# Patient Record
Sex: MALE | Race: WHITE | Employment: FULL TIME | URBAN - METROPOLITAN AREA
[De-identification: names, ages, dates, MRNs, and addresses within clinical notes are randomized per-mention and may not be internally consistent; named-entity substitution may affect disease eponyms.]

---

## 2023-01-25 ENCOUNTER — LAB ENCOUNTER (OUTPATIENT)
Dept: LAB | Age: 38
End: 2023-01-25
Attending: FAMILY MEDICINE
Payer: COMMERCIAL

## 2023-01-25 ENCOUNTER — EKG ENCOUNTER (OUTPATIENT)
Dept: LAB | Age: 38
End: 2023-01-25
Attending: FAMILY MEDICINE
Payer: COMMERCIAL

## 2023-01-25 ENCOUNTER — OFFICE VISIT (OUTPATIENT)
Dept: FAMILY MEDICINE CLINIC | Facility: CLINIC | Age: 38
End: 2023-01-25

## 2023-01-25 ENCOUNTER — HOSPITAL ENCOUNTER (OUTPATIENT)
Dept: GENERAL RADIOLOGY | Age: 38
Discharge: HOME | End: 2023-01-25
Attending: FAMILY MEDICINE
Payer: COMMERCIAL

## 2023-01-25 VITALS
WEIGHT: 266.63 LBS | HEART RATE: 114 BPM | HEIGHT: 70 IN | SYSTOLIC BLOOD PRESSURE: 156 MMHG | DIASTOLIC BLOOD PRESSURE: 104 MMHG | BODY MASS INDEX: 38.17 KG/M2

## 2023-01-25 DIAGNOSIS — Z00.00 PHYSICAL EXAM: ICD-10-CM

## 2023-01-25 DIAGNOSIS — G89.29 CHRONIC BILATERAL LOW BACK PAIN WITH LEFT-SIDED SCIATICA: ICD-10-CM

## 2023-01-25 DIAGNOSIS — M54.42 CHRONIC BILATERAL LOW BACK PAIN WITH LEFT-SIDED SCIATICA: ICD-10-CM

## 2023-01-25 DIAGNOSIS — Z00.00 PHYSICAL EXAM: Primary | ICD-10-CM

## 2023-01-25 DIAGNOSIS — E66.09 CLASS 2 OBESITY DUE TO EXCESS CALORIES WITHOUT SERIOUS COMORBIDITY WITH BODY MASS INDEX (BMI) OF 38.0 TO 38.9 IN ADULT: ICD-10-CM

## 2023-01-25 LAB
ALBUMIN SERPL-MCNC: 4.3 G/DL (ref 3.4–5)
ALBUMIN/GLOB SERPL: 1 {RATIO} (ref 1–2)
ALP LIVER SERPL-CCNC: 73 U/L
ALT SERPL-CCNC: 132 U/L
ANION GAP SERPL CALC-SCNC: 14 MMOL/L (ref 0–18)
AST SERPL-CCNC: 50 U/L (ref 15–37)
BASOPHILS # BLD AUTO: 0.05 X10(3) UL (ref 0–0.2)
BASOPHILS NFR BLD AUTO: 0.8 %
BILIRUB SERPL-MCNC: 1.1 MG/DL (ref 0.1–2)
BILIRUB UR QL: NEGATIVE
BUN BLD-MCNC: 13 MG/DL (ref 7–18)
BUN/CREAT SERPL: 13.3 (ref 10–20)
CALCIUM BLD-MCNC: 9.6 MG/DL (ref 8.5–10.1)
CHLORIDE SERPL-SCNC: 101 MMOL/L (ref 98–112)
CHOLEST SERPL-MCNC: 189 MG/DL (ref ?–200)
CLARITY UR: CLEAR
CO2 SERPL-SCNC: 21 MMOL/L (ref 21–32)
COLOR UR: YELLOW
CREAT BLD-MCNC: 0.98 MG/DL
DEPRECATED RDW RBC AUTO: 37.3 FL (ref 35.1–46.3)
EOSINOPHIL # BLD AUTO: 0.07 X10(3) UL (ref 0–0.7)
EOSINOPHIL NFR BLD AUTO: 1.1 %
ERYTHROCYTE [DISTWIDTH] IN BLOOD BY AUTOMATED COUNT: 12.2 % (ref 11–15)
EST. AVERAGE GLUCOSE BLD GHB EST-MCNC: 303 MG/DL (ref 68–126)
FASTING PATIENT LIPID ANSWER: NO
FASTING STATUS PATIENT QL REPORTED: NO
GFR SERPLBLD BASED ON 1.73 SQ M-ARVRAT: 102 ML/MIN/1.73M2 (ref 60–?)
GLOBULIN PLAS-MCNC: 4.1 G/DL (ref 2.8–4.4)
GLUCOSE BLD-MCNC: 274 MG/DL (ref 70–99)
GLUCOSE UR-MCNC: >=1000 MG/DL
HBA1C MFR BLD: 12.2 % (ref ?–5.7)
HCT VFR BLD AUTO: 49.8 %
HDLC SERPL-MCNC: 27 MG/DL (ref 40–59)
HGB BLD-MCNC: 17.2 G/DL
IMM GRANULOCYTES # BLD AUTO: 0.02 X10(3) UL (ref 0–1)
IMM GRANULOCYTES NFR BLD: 0.3 %
KETONES UR-MCNC: 40 MG/DL
LDLC SERPL CALC-MCNC: 81 MG/DL (ref ?–100)
LEUKOCYTE ESTERASE UR QL STRIP.AUTO: NEGATIVE
LYMPHOCYTES # BLD AUTO: 2.8 X10(3) UL (ref 1–4)
LYMPHOCYTES NFR BLD AUTO: 44.7 %
MCH RBC QN AUTO: 29.1 PG (ref 26–34)
MCHC RBC AUTO-ENTMCNC: 34.5 G/DL (ref 31–37)
MCV RBC AUTO: 84.1 FL
MONOCYTES # BLD AUTO: 0.43 X10(3) UL (ref 0.1–1)
MONOCYTES NFR BLD AUTO: 6.9 %
NEUTROPHILS # BLD AUTO: 2.9 X10 (3) UL (ref 1.5–7.7)
NEUTROPHILS # BLD AUTO: 2.9 X10(3) UL (ref 1.5–7.7)
NEUTROPHILS NFR BLD AUTO: 46.2 %
NITRITE UR QL STRIP.AUTO: NEGATIVE
NONHDLC SERPL-MCNC: 162 MG/DL (ref ?–130)
OSMOLALITY SERPL CALC.SUM OF ELEC: 292 MOSM/KG (ref 275–295)
PH UR: 5.5 [PH] (ref 5–8)
PLATELET # BLD AUTO: 217 10(3)UL (ref 150–450)
POTASSIUM SERPL-SCNC: 4.1 MMOL/L (ref 3.5–5.1)
PROT SERPL-MCNC: 8.4 G/DL (ref 6.4–8.2)
PROT UR-MCNC: >=300 MG/DL
PSA SERPL-MCNC: 0.68 NG/ML (ref ?–4)
RBC # BLD AUTO: 5.92 X10(6)UL
SODIUM SERPL-SCNC: 136 MMOL/L (ref 136–145)
SP GR UR STRIP: 1.02 (ref 1–1.03)
TRIGL SERPL-MCNC: 504 MG/DL (ref 30–149)
TSI SER-ACNC: 1.6 MIU/ML (ref 0.36–3.74)
UROBILINOGEN UR STRIP-ACNC: 0.2
VIT D+METAB SERPL-MCNC: 13.8 NG/ML (ref 30–100)
VLDLC SERPL CALC-MCNC: 81 MG/DL (ref 0–30)
WBC # BLD AUTO: 6.3 X10(3) UL (ref 4–11)

## 2023-01-25 PROCEDURE — 3080F DIAST BP >= 90 MM HG: CPT | Performed by: FAMILY MEDICINE

## 2023-01-25 PROCEDURE — 72110 X-RAY EXAM L-2 SPINE 4/>VWS: CPT | Performed by: FAMILY MEDICINE

## 2023-01-25 PROCEDURE — 81001 URINALYSIS AUTO W/SCOPE: CPT

## 2023-01-25 PROCEDURE — 81015 MICROSCOPIC EXAM OF URINE: CPT

## 2023-01-25 PROCEDURE — 36415 COLL VENOUS BLD VENIPUNCTURE: CPT

## 2023-01-25 PROCEDURE — 3008F BODY MASS INDEX DOCD: CPT | Performed by: FAMILY MEDICINE

## 2023-01-25 PROCEDURE — 84443 ASSAY THYROID STIM HORMONE: CPT

## 2023-01-25 PROCEDURE — 99203 OFFICE O/P NEW LOW 30 MIN: CPT | Performed by: FAMILY MEDICINE

## 2023-01-25 PROCEDURE — 83036 HEMOGLOBIN GLYCOSYLATED A1C: CPT

## 2023-01-25 PROCEDURE — 85025 COMPLETE CBC W/AUTO DIFF WBC: CPT

## 2023-01-25 PROCEDURE — 93005 ELECTROCARDIOGRAM TRACING: CPT

## 2023-01-25 PROCEDURE — 80053 COMPREHEN METABOLIC PANEL: CPT

## 2023-01-25 PROCEDURE — 99385 PREV VISIT NEW AGE 18-39: CPT | Performed by: FAMILY MEDICINE

## 2023-01-25 PROCEDURE — 3077F SYST BP >= 140 MM HG: CPT | Performed by: FAMILY MEDICINE

## 2023-01-25 PROCEDURE — 84153 ASSAY OF PSA TOTAL: CPT

## 2023-01-25 PROCEDURE — 80061 LIPID PANEL: CPT

## 2023-01-25 PROCEDURE — 82306 VITAMIN D 25 HYDROXY: CPT

## 2023-01-25 PROCEDURE — 93010 ELECTROCARDIOGRAM REPORT: CPT | Performed by: INTERNAL MEDICINE

## 2023-01-25 PROCEDURE — 3046F HEMOGLOBIN A1C LEVEL >9.0%: CPT | Performed by: FAMILY MEDICINE

## 2023-01-25 RX ORDER — CYCLOBENZAPRINE HCL 5 MG
5 TABLET ORAL 3 TIMES DAILY PRN
Qty: 30 TABLET | Refills: 0 | Status: SHIPPED | OUTPATIENT
Start: 2023-01-25

## 2023-01-26 LAB
ATRIAL RATE: 92 BPM
P AXIS: 48 DEGREES
P-R INTERVAL: 184 MS
Q-T INTERVAL: 370 MS
QRS DURATION: 116 MS
QTC CALCULATION (BEZET): 457 MS
R AXIS: -74 DEGREES
T AXIS: 24 DEGREES
VENTRICULAR RATE: 92 BPM

## 2023-02-02 ENCOUNTER — TELEPHONE (OUTPATIENT)
Dept: FAMILY MEDICINE CLINIC | Facility: CLINIC | Age: 38
End: 2023-02-02

## 2023-02-02 ENCOUNTER — OFFICE VISIT (OUTPATIENT)
Dept: FAMILY MEDICINE CLINIC | Facility: CLINIC | Age: 38
End: 2023-02-02

## 2023-02-02 VITALS
BODY MASS INDEX: 38.34 KG/M2 | SYSTOLIC BLOOD PRESSURE: 122 MMHG | HEIGHT: 70 IN | HEART RATE: 106 BPM | DIASTOLIC BLOOD PRESSURE: 68 MMHG | WEIGHT: 267.81 LBS

## 2023-02-02 DIAGNOSIS — R94.31 ABNORMAL EKG: ICD-10-CM

## 2023-02-02 DIAGNOSIS — E55.9 VITAMIN D DEFICIENCY: ICD-10-CM

## 2023-02-02 DIAGNOSIS — E11.9 TYPE 2 DIABETES MELLITUS WITHOUT COMPLICATION, WITHOUT LONG-TERM CURRENT USE OF INSULIN (HCC): Primary | ICD-10-CM

## 2023-02-02 DIAGNOSIS — E78.1 HYPERTRIGLYCERIDEMIA: ICD-10-CM

## 2023-02-02 PROBLEM — E66.01 MORBID (SEVERE) OBESITY DUE TO EXCESS CALORIES (HCC): Status: ACTIVE | Noted: 2023-02-02

## 2023-02-02 PROCEDURE — 3074F SYST BP LT 130 MM HG: CPT | Performed by: FAMILY MEDICINE

## 2023-02-02 PROCEDURE — 99214 OFFICE O/P EST MOD 30 MIN: CPT | Performed by: FAMILY MEDICINE

## 2023-02-02 PROCEDURE — 3008F BODY MASS INDEX DOCD: CPT | Performed by: FAMILY MEDICINE

## 2023-02-02 PROCEDURE — 3078F DIAST BP <80 MM HG: CPT | Performed by: FAMILY MEDICINE

## 2023-02-02 RX ORDER — FENOFIBRATE 67 MG/1
1 CAPSULE ORAL NIGHTLY
Qty: 90 CAPSULE | Refills: 3 | Status: SHIPPED | OUTPATIENT
Start: 2023-02-02 | End: 2023-03-04

## 2023-02-02 RX ORDER — EMPAGLIFLOZIN 25 MG/1
1 TABLET, FILM COATED ORAL DAILY
Qty: 90 TABLET | Refills: 2 | Status: SHIPPED | OUTPATIENT
Start: 2023-02-02

## 2023-02-02 RX ORDER — ERGOCALCIFEROL 1.25 MG/1
50000 CAPSULE ORAL WEEKLY
Qty: 12 CAPSULE | Refills: 4 | Status: SHIPPED | OUTPATIENT
Start: 2023-02-02 | End: 2023-03-04

## 2023-02-03 ENCOUNTER — TELEPHONE (OUTPATIENT)
Dept: FAMILY MEDICINE CLINIC | Facility: CLINIC | Age: 38
End: 2023-02-03

## 2023-02-03 NOTE — TELEPHONE ENCOUNTER
Patient called office. Patient's date of birth and full name both confirmed. Asking for prescriptions to be [facility] to [facility]. He was there yesterday at 5pm, but they only gave him metformin and fenofibrate. RN advised Vitamind D2 was sent. And advised him to speak with pharmacy staff again. He verbalizes understanding of all information, and agreeable to plan. E-Prescribing Status: Receipt confirmed by pharmacy (2/2/2023 12:01 PM CST)    Per Prior authorization encounter, RN advised Jardiance Prior Authorization was initiated, and await 1-5 days for a decision. He verbalizes understanding of all information, and agreeable to plan.

## 2023-02-07 ENCOUNTER — MED REC SCAN ONLY (OUTPATIENT)
Dept: FAMILY MEDICINE CLINIC | Facility: CLINIC | Age: 38
End: 2023-02-07

## 2023-02-07 NOTE — TELEPHONE ENCOUNTER
Called patient and he states that pharmacy told him that Amisha Feliz is ready for  and cost will be $35.00. Suggested that patient call his insurance to get name of  covered alternative and to call us with that information.

## 2023-02-07 NOTE — TELEPHONE ENCOUNTER
Prior authorization for jardiance has been denied. Please refer to denial letter that was faxed to the office. Denied    PA Case: [id number], Status: Denied. Notification: Completed.  Case ID: [id number]      Payer:  [employer]   Electronic appeal:  Not supported   View History

## 2023-02-10 ENCOUNTER — HOSPITAL ENCOUNTER (OUTPATIENT)
Dept: ENDOCRINOLOGY | Age: 38
Discharge: HOME | End: 2023-02-10
Attending: FAMILY MEDICINE
Payer: COMMERCIAL

## 2023-02-10 DIAGNOSIS — E11.65 TYPE 2 DIABETES MELLITUS WITH HYPERGLYCEMIA, WITHOUT LONG-TERM CURRENT USE OF INSULIN (HCC): Primary | ICD-10-CM

## 2023-02-10 NOTE — PROGRESS NOTES
Grace Marquez  DOB2/28/1985 attended Step 2 1:1 Diabetes Education Class:    Date: 2/10/2023  Start time:1030 End time: 36    Wt: Wt Readings from Last 1 Encounters:  02/02/23 : 267 lb 12.8 oz    Weight change since start of program: lost 4 lb    Diabetes Overview:  Pathophysiology, pre-diabetes, A1C results, treatment options for diabetes self-management, types of diabetes, myths and facts, risk factors, benefits of good blood glucose control and psychosocial aspects reviewed. Patient has demonstrated inconsistent self-monitoring and dietary tracking. Healthy Eating:  Reviewed Balanced Plate Method and discussed examples of Balanced Plate Method meal planning. Reviewed macronutrients (carbohydrate, protein, fat) and their impact on blood glucose levels. Introduced benefits of lower fat food choices. Being Active:  Discussed exercise benefits and precautions including its effect on blood glucose levels. Monitoring:  Benefits and options for glucose monitoring, target BG goals, HgbA1C values. Emphasized importance and benefit of dietary tracking. Problem Solving: Prevention, detection and treatment of acute complications:  Discussed signs, symptoms and treatment of hypoglycemia and hyperglycemia. Medication:  Instructed on classes of Diabetes medications available and additional cardiovascular and renal benefits. Behavior Change:  Goals set for nutrition and monitoring of blood glucose. Discussed how habits are formed, identifying cues and triggers. Discussed identifying barriers to action. Discussed how dietary tracking benefits weight loss. Reviewed and updated individual goals and action plan set by patient. Recommendations:  Implement healthy eating habits with portion control and following Balanced Plate Method. Monitor blood glucose as directed. Attend remaining sessions. Continue to implement individual goals.     Written materials provided for all areas covered. Patient verbalized understanding and has no further questions at this time.     Sonia Burgess, RN,MSN

## 2023-02-17 ENCOUNTER — MED REC SCAN ONLY (OUTPATIENT)
Dept: FAMILY MEDICINE CLINIC | Facility: CLINIC | Age: 38
End: 2023-02-17

## 2023-02-24 ENCOUNTER — HOSPITAL ENCOUNTER (OUTPATIENT)
Dept: ENDOCRINOLOGY | Age: 38
Discharge: HOME | End: 2023-02-24
Attending: FAMILY MEDICINE
Payer: COMMERCIAL

## 2023-02-24 DIAGNOSIS — E11.65 TYPE 2 DIABETES MELLITUS WITH HYPERGLYCEMIA, WITHOUT LONG-TERM CURRENT USE OF INSULIN (HCC): Primary | ICD-10-CM

## 2023-02-24 NOTE — PROGRESS NOTES
Jaun Vidales  DOB2/28/1985 attended Step 2 & 3 1:1 Diabetes Education Class: per MD order    Date: 2/24/2023  Start time: 1100 am End time: 1200 pm    Wt: Wt Readings from Last 1 Encounters:  02/02/23 : 267 lb 12.8 oz    Weight change since start of program: lost 5 lb    Blood Glucose: Not controlled: Not progessing toward goal     Healthy Eating:  Reviewed Basic Diet Guidelines as foundation of diabetes meal planning. Reinforced the balanced plate method. Taught nutrition basics defining carbohydrate, protein, and fat. Taught label reading, including an option to count carbohydrate grams or servings. Provided patient with goals for carbohydrate grams/choices for meals and snacks. Discussed sugar substitutes, ETOH and effect on blood glucose. Candido's helpful for smart phones, as well as websites for examining carbohydrate levels provided. Reviewed and reinforced macronutrient's, carbohydrate counting and meal planning. Problem Solving:  Reinforced signs, symptoms, and treatment of hypoglycemia using the Rule of 15. Discussed impact of different food items and portion sizes on blood glucose levels. Discussed blood glucose target of 180 mg/dL, if testing 2 hours post meal.    Monitoring:  Reviewed blood glucose records and importance of tracking foods/blood glucose levels. Reinforced the importance of taking medications as prescribed. Behavior Change:  Reviewed and updated individual goals and action plan set by patient. Addressed barriers to tracking foods/blood glucose levels and following guidelines presented/achieving goals, as a group discussion. Recommendations: Follow individual meal plan recommended by Educator (Matthew Mcgill). Continue implementing individual goals. Attend remaining sessions. Begin implementing carbohydrate counting and continue dietary and blood glucose tracking. Patient interested in starting [employer] provided for all areas covered.     Patient verbalized understanding and has no further questions currently     Donnell Bansal, RN,MSN,[facility]

## 2023-03-24 ENCOUNTER — TELEPHONE (OUTPATIENT)
Dept: ENDOCRINOLOGY | Facility: HOSPITAL | Age: 38
End: 2023-03-24

## 2023-03-24 NOTE — TELEPHONE ENCOUNTER
Alvin Muir reached out to patient to re confirm appt time. Pt has expressed interest to start Ozempic . 25 mg weekly.  Order pended for Dr Allyn Dennis approval.

## 2023-03-30 ENCOUNTER — HOSPITAL ENCOUNTER (OUTPATIENT)
Dept: ENDOCRINOLOGY | Age: 38
Discharge: HOME | End: 2023-03-30
Attending: FAMILY MEDICINE
Payer: COMMERCIAL

## 2023-03-30 DIAGNOSIS — E11.65 TYPE 2 DIABETES MELLITUS WITH HYPERGLYCEMIA, WITHOUT LONG-TERM CURRENT USE OF INSULIN (HCC): Primary | ICD-10-CM

## 2023-03-30 NOTE — PROGRESS NOTES
Annel Granado  DOB2/28/1985 attended Step 4 Group Diabetes Education Class:     Date: 3/30/2023  Start time: 0930 End time: 56    Wt: Wt Readings from Last 1 Encounters:  02/02/23 : 267 lb 12.8 oz    Weight change since start of program: lost 12 lb    Blood Glucose: Not controlled: Progressing toward goal    Reviewed information covered in previous classes including benefits of maintaining good blood glucose control and healthy weight in decreasing diabetes complications. Prevention, detection, and treatment of chronic complications  Reviewed how to reduce risks of complications including eye, foot, dental, renal, and cardiovascular. Discussed American Diabetes Association guidelines for testing including eye exam, lipid panels, urine protein tests, dental and foot exams. Encouraged to get annual flu shot. Discussed importance of immunizations, vaccinations, and guidelines for sick day management. Discussed wearing medical ID. Problem Solving  Discussed signs, symptoms, and prevention of DKA and HHNS. Discussed disaster preparedness and Diabetes. Discussed Diabetes medication assistance and supply management. Healthy Eating  Reviewed and reinforced macronutrients, carbohydrate counting, and meal planning. Reviewed meal planning methods. Discussed healthy eating approaches for dining out, holidays and special occasions. Provided tips on how family members can support healthy changes in diet. Behavior Change  Reviewed and updated individual goals and action plan set by patient. Recommendations:  Monitor blood glucose as directed. Follow individual meal plan and continue dietary tracking. Attend remaining sessions. Continue to implement individual goals. Written materials provided for all areas covered. Patient verbalized understanding and has no further questions at this time.      Lara Mcgarry, RN,MSN  Patient will follow up with educator in 1 month for review of [address] [place] [address]

## 2023-05-03 ENCOUNTER — OFFICE VISIT (OUTPATIENT)
Dept: FAMILY MEDICINE CLINIC | Facility: CLINIC | Age: 38
End: 2023-05-03

## 2023-05-03 ENCOUNTER — LAB ENCOUNTER (OUTPATIENT)
Dept: LAB | Age: 38
End: 2023-05-03
Attending: FAMILY MEDICINE
Payer: COMMERCIAL

## 2023-05-03 VITALS
SYSTOLIC BLOOD PRESSURE: 130 MMHG | HEART RATE: 84 BPM | BODY MASS INDEX: 36.7 KG/M2 | WEIGHT: 256.38 LBS | DIASTOLIC BLOOD PRESSURE: 80 MMHG | HEIGHT: 70 IN

## 2023-05-03 DIAGNOSIS — E11.9 TYPE 2 DIABETES MELLITUS WITHOUT COMPLICATION, WITHOUT LONG-TERM CURRENT USE OF INSULIN (HCC): ICD-10-CM

## 2023-05-03 LAB
CARTRIDGE LOT#: ABNORMAL NUMERIC
CREAT UR-SCNC: 102 MG/DL
HEMOGLOBIN A1C: 6.9 % (ref 4.3–5.6)
MICROALBUMIN UR-MCNC: 2.46 MG/DL
MICROALBUMIN/CREAT 24H UR-RTO: 24.1 UG/MG (ref ?–30)

## 2023-05-03 PROCEDURE — 99213 OFFICE O/P EST LOW 20 MIN: CPT | Performed by: FAMILY MEDICINE

## 2023-05-03 PROCEDURE — 3008F BODY MASS INDEX DOCD: CPT | Performed by: FAMILY MEDICINE

## 2023-05-03 PROCEDURE — 3079F DIAST BP 80-89 MM HG: CPT | Performed by: FAMILY MEDICINE

## 2023-05-03 PROCEDURE — 82043 UR ALBUMIN QUANTITATIVE: CPT

## 2023-05-03 PROCEDURE — 3044F HG A1C LEVEL LT 7.0%: CPT | Performed by: FAMILY MEDICINE

## 2023-05-03 PROCEDURE — 3061F NEG MICROALBUMINURIA REV: CPT | Performed by: FAMILY MEDICINE

## 2023-05-03 PROCEDURE — 82570 ASSAY OF URINE CREATININE: CPT

## 2023-05-03 PROCEDURE — 83036 HEMOGLOBIN GLYCOSYLATED A1C: CPT | Performed by: FAMILY MEDICINE

## 2023-05-03 PROCEDURE — 3075F SYST BP GE 130 - 139MM HG: CPT | Performed by: FAMILY MEDICINE

## 2023-05-03 RX ORDER — SEMAGLUTIDE 0.68 MG/ML
0.5 INJECTION, SOLUTION SUBCUTANEOUS WEEKLY
Qty: 6 ML | Refills: 1 | Status: SHIPPED | OUTPATIENT
Start: 2023-05-03 | End: 2023-06-02

## 2023-05-11 ENCOUNTER — HOSPITAL ENCOUNTER (OUTPATIENT)
Dept: ENDOCRINOLOGY | Age: 38
Discharge: HOME | End: 2023-05-11
Attending: FAMILY MEDICINE
Payer: COMMERCIAL

## 2023-05-11 DIAGNOSIS — E11.9 TYPE 2 DIABETES MELLITUS WITHOUT COMPLICATION, WITHOUT LONG-TERM CURRENT USE OF INSULIN (HCC): Primary | ICD-10-CM

## 2023-05-11 RX ORDER — SEMAGLUTIDE 2.68 MG/ML
2 INJECTION, SOLUTION SUBCUTANEOUS WEEKLY
Qty: 1 EACH | Refills: 12 | Status: CANCELLED | OUTPATIENT
Start: 2023-05-11

## 2023-05-11 NOTE — PROGRESS NOTES
Rachel Lancaster  DOB2/28/1985 attended Step 5  Diabetes Education Class:    Date: 5/11/2023  Start time: 1045 End time: 9749     Wt: Wt Readings from Last 1 Encounters:  05/03/23 : 256 lb 6.4 oz    Weight change since start of program: lost 18 lb    POC HbA1c: 6.9% today    Blood Glucose: Controlled: Stable    Healthy Eating  Reviewed and reinforced macronutrients, carbohydrate counting, and meal planning. Reviewed meal planning methods. Taught principles of heart healthy eating (fats, cholesterol, fiber, and sodium intake). Discussed Mediterranean meal planning. Taught label guidelines for choosing fat controlled proteins, snacks and desserts. Discussed how to integrate information for meal planning. Medication   Guidelines for cholesterol medications for persons with Diabetes. Healthy Coping  Instructed on developing and implementing a support plan. Discussed avoiding Diabetes burnout, dealing with stress and stress reduction techniques. Discussed recognizing and dealing with depression and diabetes. Being Active  Reinforced the benefits of exercise and safety precautions. Discussed the effects of blood glucose levels and role in weight loss. Discussed strategies for increasing activity and maintaining regular exercise regimen. Problem Solving  List of community resources provided and discussed. Behavior Change  Reviewed and updated individual goals set by patient. Action plan completed and Diabetes support plan initiated    Patient has identified the following goal(s) and actions related to: Healthy Eating, Being Active, Monitoring Blood Glucose and Taking Medications  Diabetes goal assessment and action plan scanned into Media  Rachel Lancaster has chosen the following ongoing Diabetes Support Plan: Exercise programs    Written materials provided for all areas covered. Patient verbalized understanding and has no further questions at this time.      Stan Vidales, RN,MSN

## 2023-07-26 DIAGNOSIS — E11.9 TYPE 2 DIABETES MELLITUS WITHOUT COMPLICATION, WITHOUT LONG-TERM CURRENT USE OF INSULIN (HCC): ICD-10-CM

## 2023-07-27 RX ORDER — SEMAGLUTIDE 0.68 MG/ML
0.5 INJECTION, SOLUTION SUBCUTANEOUS WEEKLY
Qty: 6 ML | Refills: 1 | Status: SHIPPED | OUTPATIENT
Start: 2023-07-27 | End: 2023-08-26

## 2023-07-27 NOTE — TELEPHONE ENCOUNTER
Refill passed per [employer], [facility] protocol. Requested Prescriptions   Pending Prescriptions Disp Refills    semaglutide (OZEMPIC, 0.25 OR 0.5 MG/DOSE,) 2 MG/3ML Subcutaneous Solution Pen-injector 6 mL 1     Sig: Inject 0.5 mg into the skin once a week.        Diabetes Medication Protocol Passed - 7/26/2023 10:52 AM        Passed - Last A1C < 7.5 and within past 6 months     Lab Results   Component Value Date    A1C 6.9 (A) 05/03/2023             Passed - In person appointment or virtual visit in the past 6 mos or appointment in next 3 mos     Recent Outpatient Visits              2 months ago Type 2 diabetes mellitus without complication, without long-term current use of insulin ([address]    [facility], Rhys Velez MD    Office Visit    5 months ago Type 2 diabetes mellitus without complication, without long-term current use of insulin [facility])    [facility], Oswaldo Arguelles MD    Office Visit    6 months ago Physical exam    [address] [facility], Oswaldo Arguelles MD    Office Visit    2 years ago Acute bilateral low back pain without sciatica    [address] [facility]    Office Visit          Future Appointments         Provider Department Appt Notes    In 3 months Isaura Velez MD [address] [facility], Rhys Montalvo [address] or GFRNAA > 50     GFR Evaluation  EGFRCR: 102 , resulted on 1/25/2023          Passed - GFR in the past 12 months              Recent Outpatient Visits              2 months ago Type 2 diabetes mellitus without complication, without long-term current use of insulin [facility])    [facility], Oswaldo Arguelles MD    Office Visit    5 months ago Type 2 diabetes mellitus without complication, without long-term current use of insulin ([address]    [facility], [place] Warner, Rhys Loredo MD    Office Visit    6 months ago Physical exam    [address], [address], Marisa Gorman MD    Office Visit    2 years ago Acute bilateral low back pain without sciatica    [address] [facility],     Office Visit            Future Appointments         Provider Department Appt Notes    In 3 months MD Rodney Anderson [place] [address] [facility]

## 2023-08-04 RX ORDER — EMPAGLIFLOZIN 25 MG/1
1 TABLET, FILM COATED ORAL DAILY
Qty: 90 TABLET | Refills: 0 | Status: SHIPPED | OUTPATIENT
Start: 2023-08-04

## 2023-08-04 NOTE — TELEPHONE ENCOUNTER
Jardiance 25mg   Take 1 tablet by mouth daily. , Normal, Disp-90 tablet, R-2   Dispense: 90 tablet   Refills: 2 ordered   Pharmacy: [place] [id number] [id number], [phone], [phone] (Ph: [phone])   Order Details  Ordered on: 02/02/23   Authorizing provider: Juvencio John MD     Sending remainder of 2/2/23 prescription to requested pharmacy. Refill passed per [facility] protocol. Requested Prescriptions   Pending Prescriptions Disp Refills    Empagliflozin (JARDIANCE) 25 MG Oral Tab 90 tablet 2     Sig: Take 1 tablet by mouth daily.        Diabetes Medication Protocol Passed - 8/4/2023  4:20 AM        Passed - Last A1C < 7.5 and within past 6 months     Lab Results   Component Value Date    A1C 6.9 (A) 05/03/2023             Passed - In person appointment or virtual visit in the past 6 mos or appointment in next 3 mos     Recent Outpatient Visits              3 months ago Type 2 diabetes mellitus without complication, without long-term current use of insulin [facility])    [address], [place] Warner, Gil Quach MD    Office Visit    6 months ago Type 2 diabetes mellitus without complication, without long-term current use of insulin [facility])    [address], [place], Gil Quach MD    Office Visit    6 months ago Physical exam    [address] [facility], Gil Quach MD    Office Visit    2 years ago Acute bilateral low back pain without sciatica    [address] [facility],     Office Visit          Future Appointments         Provider Department Appt Notes    In 2 months Juven[facility]io John MD [address] [facility] [place] [facility]               Passed - [facility] or GFRNAA > 50     GFR Evaluation  EGFRCR: 102 , resulted on 1/25/2023          Passed - GFR in the past 12 months           Recent Outpatient Visits              3 months ago Type 2 diabetes mellitus without complication, without long-term current use of insulin [facility])    [address] [facility], [facility], Rhys Roberto MD    Office Visit    6 months ago Type 2 diabetes mellitus without complication, without long-term current use of insulin [facility])    [address] [facility],[address], [facility], Migdalia Puga MD    Office Visit    6 months ago Physical exam    [address] Migdalia Campa MD    Office Visit    2 years ago Acute bilateral low back pain without sciatica    [address] [facility],     Office Visit          Future Appointments         Provider Department Appt Notes    In 2 months Shun Roberto MD [address], [place] [address] [facility]

## 2023-08-25 DIAGNOSIS — E11.9 TYPE 2 DIABETES MELLITUS WITHOUT COMPLICATION, WITHOUT LONG-TERM CURRENT USE OF INSULIN (HCC): ICD-10-CM

## 2023-08-27 RX ORDER — SEMAGLUTIDE 0.68 MG/ML
0.5 INJECTION, SOLUTION SUBCUTANEOUS WEEKLY
Qty: 6 ML | Refills: 1 | Status: SHIPPED | OUTPATIENT
Start: 2023-08-27 | End: 2023-12-17

## 2023-08-27 NOTE — TELEPHONE ENCOUNTER
Refill passed per [facility] [employer], [facility] protocol. Please advise patient never read message:    Daniel Rich, RN   to [facility]      5/11/23  1:48 PM  Antony Estevez  At this point Dr Elgin Shah would like you to continue on your dose of .50 mg weekly of Ozempic. Any other medication questions can be directed to Dr. Elgin Shah. Great work on your diabetes management,  Donnell Bansal,MSN,RN,[facility] [place]    This [employer] message has not been read. Requested Prescriptions   Pending Prescriptions Disp Refills    semaglutide (OZEMPIC, 0.25 OR 0.5 MG/DOSE,) 2 MG/3ML Subcutaneous Solution Pen-injector 6 mL 1     Sig: Inject 0.5 mg into the skin once a week.        Diabetes Medication Protocol Passed - 8/25/2023  5:13 PM        Passed - Last A1C < 7.5 and within past 6 months     Lab Results   Component Value Date    A1C 6.9 (A) 05/03/2023             Passed - In person appointment or virtual visit in the past 6 mos or appointment in next 3 mos     Recent Outpatient Visits              3 months ago Type 2 diabetes mellitus without complication, without long-term current use of insulin [facility])    [place] Avila Jennifer Edouard, MD    Office Visit    6 months ago Type 2 diabetes mellitus without complication, without long-term current use of insulin [facility])    [place] Avila, Marisa Gorman MD    Office Visit    7 months ago Physical exam    Marisa Marti MD    Office Visit    2 years ago Acute bilateral low back pain without sciatica    [address] [facility],     Office Visit          Future Appointments         Provider Department Appt Notes    In 2 months MD Steven Anderson [place] [address] [facility]               Passed - [facility] or GFRNAA > 50     GFR Evaluation  EGFRCR: 102 , resulted on 1/25/2023          Passed - GFR in the past 12 months

## 2023-09-12 LAB — AMB EXT COVID-19 RESULT: DETECTED

## 2023-09-13 ENCOUNTER — NURSE TRIAGE (OUTPATIENT)
Dept: FAMILY MEDICINE CLINIC | Facility: CLINIC | Age: 38
End: 2023-09-13

## 2023-09-14 ENCOUNTER — TELEMEDICINE (OUTPATIENT)
Dept: FAMILY MEDICINE CLINIC | Facility: CLINIC | Age: 38
End: 2023-09-14

## 2023-09-14 DIAGNOSIS — R09.81 NASAL CONGESTION: ICD-10-CM

## 2023-09-14 DIAGNOSIS — U07.1 COVID-19 VIRUS INFECTION: Primary | ICD-10-CM

## 2023-09-14 RX ORDER — FLUTICASONE PROPIONATE 50 MCG
2 SPRAY, SUSPENSION (ML) NASAL DAILY
Qty: 16 G | Refills: 2 | Status: SHIPPED | OUTPATIENT
Start: 2023-09-14 | End: 2023-10-14

## 2023-10-21 DIAGNOSIS — E11.9 TYPE 2 DIABETES MELLITUS WITHOUT COMPLICATION, WITHOUT LONG-TERM CURRENT USE OF INSULIN (HCC): ICD-10-CM

## 2023-10-23 RX ORDER — CYCLOBENZAPRINE HCL 5 MG
5 TABLET ORAL 3 TIMES DAILY PRN
Qty: 30 TABLET | Refills: 0 | Status: SHIPPED | OUTPATIENT
Start: 2023-10-23

## 2023-10-23 RX ORDER — SEMAGLUTIDE 0.68 MG/ML
0.5 INJECTION, SOLUTION SUBCUTANEOUS WEEKLY
Qty: 6 ML | Refills: 1 | Status: SHIPPED | OUTPATIENT
Start: 2023-10-23 | End: 2024-02-12

## 2023-10-23 NOTE — TELEPHONE ENCOUNTER
Please review. Protocol failed / Has no protocol. Requested Prescriptions   Pending Prescriptions Disp Refills    cyclobenzaprine 5 MG Oral Tab 30 tablet 0     Sig: Take 1 tablet (5 mg total) by mouth 3 (three) times daily as needed. There is no refill protocol information for this order       semaglutide (OZEMPIC, 0.25 OR 0.5 MG/DOSE,) 2 MG/3ML Subcutaneous Solution Pen-injector 6 mL 1     Sig: Inject 0.5 mg into the skin once a week.        Diabetes Medication Protocol Passed - 10/21/2023  2:17 PM        Passed - Last A1C < 7.5 and within past 6 months     Lab Results   Component Value Date    A1C 6.9 (A) 05/03/2023             Passed - In person appointment or virtual visit in the past 6 mos or appointment in next 3 mos     Recent Outpatient Visits              1 month ago COVID-19 virus infection    [facility], [address], [place], [place]    Telemedicine    5 months ago Type 2 diabetes mellitus without complication, without long-term current use of insulin ([facility] [address]    [address], [place] Warner, Cl العراقي MD    Office Visit    8 months ago Type 2 diabetes mellitus without complication, without long-term current use of insulin [facility])    [address], [place] Warner, Cl العراقي MD    Office Visit    9 months ago Physical exam    Cl Valdovinos MD    Office Visit    2 years ago Acute bilateral low back pain without sciatica    [address] [facility]    Office Visit          Future Appointments         Provider Department Appt Notes    In 1 week MD Bimal Patterson [place] [address] [facility]                      Passed - [facility] or GFRNAA > 50     GFR Evaluation  EGFRCR: 102 , resulted on 1/25/2023          Passed - GFR in the past 12 months           Future Appointments         Provider Department Appt Notes    In 1 week Mor Dias MD [address] [facility], Rhys 6 [address]           Recent Outpatient Visits              1 month ago COVID-19 virus infection    [facility], [address], [place], [place]    Telemedicine    5 months ago Type 2 diabetes mellitus without complication, without long-term current use of insulin [facility])    [address], [place] [address], Rhys Dias MD    Office Visit    8 months ago Type 2 diabetes mellitus without complication, without long-term current use of insulin [facility])    [address], [place] [address], Ayad Raymond MD    Office Visit    9 months ago Physical exam    [address] [facility], Ayad Raymond MD    Office Visit    2 years ago Acute bilateral low back pain without sciatica    [address] [facility],     Office Visit

## 2023-11-01 ENCOUNTER — OFFICE VISIT (OUTPATIENT)
Dept: FAMILY MEDICINE CLINIC | Facility: CLINIC | Age: 38
End: 2023-11-01
Payer: COMMERCIAL

## 2023-11-01 ENCOUNTER — LAB ENCOUNTER (OUTPATIENT)
Dept: LAB | Age: 38
End: 2023-11-01
Attending: FAMILY MEDICINE
Payer: COMMERCIAL

## 2023-11-01 VITALS
HEART RATE: 82 BPM | WEIGHT: 252.38 LBS | DIASTOLIC BLOOD PRESSURE: 85 MMHG | SYSTOLIC BLOOD PRESSURE: 149 MMHG | BODY MASS INDEX: 36.13 KG/M2 | HEIGHT: 70 IN

## 2023-11-01 DIAGNOSIS — E66.01 MORBID (SEVERE) OBESITY DUE TO EXCESS CALORIES (HCC): ICD-10-CM

## 2023-11-01 DIAGNOSIS — E11.9 TYPE 2 DIABETES MELLITUS WITHOUT COMPLICATION, WITHOUT LONG-TERM CURRENT USE OF INSULIN (HCC): Primary | ICD-10-CM

## 2023-11-01 DIAGNOSIS — E11.9 TYPE 2 DIABETES MELLITUS WITHOUT COMPLICATION, WITHOUT LONG-TERM CURRENT USE OF INSULIN (HCC): ICD-10-CM

## 2023-11-01 LAB
ANION GAP SERPL CALC-SCNC: 6 MMOL/L (ref 0–18)
BUN BLD-MCNC: 9 MG/DL (ref 9–23)
BUN/CREAT SERPL: 9.4 (ref 10–20)
CALCIUM BLD-MCNC: 9.7 MG/DL (ref 8.7–10.4)
CARTRIDGE LOT#: ABNORMAL NUMERIC
CHLORIDE SERPL-SCNC: 101 MMOL/L (ref 98–112)
CO2 SERPL-SCNC: 28 MMOL/L (ref 21–32)
CREAT BLD-MCNC: 0.96 MG/DL
CREAT UR-SCNC: 114.7 MG/DL
EGFRCR SERPLBLD CKD-EPI 2021: 104 ML/MIN/1.73M2 (ref 60–?)
FASTING STATUS PATIENT QL REPORTED: YES
GLUCOSE BLD-MCNC: 90 MG/DL (ref 70–99)
HEMOGLOBIN A1C: 6.3 % (ref 4.3–5.6)
MICROALBUMIN UR-MCNC: 0.5 MG/DL
MICROALBUMIN/CREAT 24H UR-RTO: 4.4 UG/MG (ref ?–30)
OSMOLALITY SERPL CALC.SUM OF ELEC: 278 MOSM/KG (ref 275–295)
POTASSIUM SERPL-SCNC: 4 MMOL/L (ref 3.5–5.1)
SODIUM SERPL-SCNC: 135 MMOL/L (ref 136–145)

## 2023-11-01 PROCEDURE — 3008F BODY MASS INDEX DOCD: CPT | Performed by: FAMILY MEDICINE

## 2023-11-01 PROCEDURE — 83036 HEMOGLOBIN GLYCOSYLATED A1C: CPT | Performed by: FAMILY MEDICINE

## 2023-11-01 PROCEDURE — 3077F SYST BP >= 140 MM HG: CPT | Performed by: FAMILY MEDICINE

## 2023-11-01 PROCEDURE — 99213 OFFICE O/P EST LOW 20 MIN: CPT | Performed by: FAMILY MEDICINE

## 2023-11-01 PROCEDURE — 90471 IMMUNIZATION ADMIN: CPT | Performed by: FAMILY MEDICINE

## 2023-11-01 PROCEDURE — 82043 UR ALBUMIN QUANTITATIVE: CPT

## 2023-11-01 PROCEDURE — 90686 IIV4 VACC NO PRSV 0.5 ML IM: CPT | Performed by: FAMILY MEDICINE

## 2023-11-01 PROCEDURE — 3044F HG A1C LEVEL LT 7.0%: CPT | Performed by: FAMILY MEDICINE

## 2023-11-01 PROCEDURE — 80048 BASIC METABOLIC PNL TOTAL CA: CPT

## 2023-11-01 PROCEDURE — 3079F DIAST BP 80-89 MM HG: CPT | Performed by: FAMILY MEDICINE

## 2023-11-01 PROCEDURE — 82570 ASSAY OF URINE CREATININE: CPT

## 2023-11-01 PROCEDURE — 36415 COLL VENOUS BLD VENIPUNCTURE: CPT

## 2023-11-01 RX ORDER — FENOFIBRATE 67 MG/1
67 CAPSULE ORAL NIGHTLY
COMMUNITY
Start: 2023-08-30

## 2023-11-01 RX ORDER — FENOFIBRATE 120 MG/1
TABLET ORAL
COMMUNITY
Start: 2023-02-01

## 2023-11-06 RX ORDER — EMPAGLIFLOZIN 25 MG/1
1 TABLET, FILM COATED ORAL DAILY
Qty: 90 TABLET | Refills: 3 | Status: SHIPPED | OUTPATIENT
Start: 2023-11-06

## 2023-11-06 NOTE — TELEPHONE ENCOUNTER
Refill passed per [place] [facility] protocol.   Requested Prescriptions   Pending Prescriptions Disp Refills    JARDIANCE 25 MG Oral Tab [Pharmacy Med Name: Clari Collins 25 MG TABLET] 90 tablet 0     Sig: TAKE 1 TABLET BY MOUTH EVERY DAY       Diabetes Medication Protocol Passed - 11/4/2023 10:30 AM        Passed - Last A1C < 7.5 and within past 6 months     Lab Results   Component Value Date    A1C 6.3 (A) 11/01/2023             Passed - In person appointment or virtual visit in the past 6 mos or appointment in next 3 mos     Recent Outpatient Visits              5 days ago Type 2 diabetes mellitus without complication, without long-term current use of insulin ([facility] [address]    [address], [place], Vishal Macias MD    Office Visit    1 month ago COVID-19 virus infection    [facility], Main [address], [place], [place] Telemedicine    6 months ago Type 2 diabetes mellitus without complication, without long-term current use of insulin ([facility] [address]    [address], [place], Vishal Macias MD    Office Visit    9 months ago Type 2 diabetes mellitus without complication, without long-term current use of insulin [facility])    [address], [place], Vishal Macias MD    Office Visit    9 months ago Physical exam    [address], [place], Vishal Macias MD    Office Visit          Future Appointments         Provider Department Appt Notes    In 6 months Quinn Callahan MD [address] [facility], [place] 6mo fu                      Passed - EGFRCR or GFRNAA > 50     GFR Evaluation  EGFRCR: 104 , resulted on 11/1/2023          Passed - GFR in the past 12 months          METFORMIN 850 MG Oral Tab [Pharmacy Med Name: METFORMIN  MG TABLET] 180 tablet 1     Sig: TAKE 1 TABLET BY MOUTH TWICE A DAY WITH MEALS       Diabetes Medication Protocol Passed - 11/4/2023 10:30 AM        Passed - Last A1C < 7.5 and within past 6 months     Lab Results   Component Value Date    A1C 6.3 (A) 11/01/2023             Passed - In person appointment or virtual visit in the past 6 mos or appointment in next 3 mos     Recent Outpatient Visits              5 days ago Type 2 diabetes mellitus without complication, without long-term current use of insulin ([facility] [address]    [address], [place], Courtney Boast, MD    Office Visit    1 month ago COVID-19 virus infection    [facility], Main [address], [place], [place]    Telemedicine    6 months ago Type 2 diabetes mellitus without complication, without long-term current use of insulin ([facility] [address]    [address], [place], Courtney Boast, MD    Office Visit    9 months ago Type 2 diabetes mellitus without complication, without long-term current use of insulin [facility])    [address], [place], Courtney Boast, MD    Office Visit    9 months ago Physical exam    [address], [place], Courtney Boast, MD    Office Visit          Future Appointments         Provider Department Appt Notes    In 6 months Kojo Cottrell MD [address], [place], [place] 6mo fu                      Passed - EGFRCR or GFRNAA > 50     GFR Evaluation  EGFRCR: 104 , resulted on 11/1/2023          Passed - GFR in the past 12 months           Recent Outpatient Visits              5 days ago Type 2 diabetes mellitus without complication, without long-term current use of insulin [facility])    [address], [place] [address], Courtney Boast, MD    Office Visit    1 month ago COVID-19 virus infection    [facility], Main [address], [place], [place]    Telemedicine    6 months ago Type 2 diabetes mellitus without complication, without long-term current use of insulin ([facility] [address], [place] Ortega MD    Office Visit    9 months ago Type 2 diabetes mellitus without complication, without long-term current use of insulin [facility])    [address], [place] [address], Cl العراقي MD    Office Visit    9 months ago Physical exam    [address], [place], Cl العراقي MD    Office Visit          Future Appointments         Provider Department Appt Notes    In 6 months Abigail Ortega MD [address], [place] 6mo fu

## 2023-11-11 NOTE — TELEPHONE ENCOUNTER
Chief Complaint   Patient presents with   • Medicare Wellness Visit     subsequent       HISTORY OF PRESENT ILLNESS: Scotty Venegas is a 84 year old  female     Reason for visit:  Medicare wellness  Patient last seen:  7/26/2023  Most recent lab work:  7/14/2023-otherwise pending    See discussion below      Patient overall is doing reasonably.  She is being followed by Dr. Palmer, an independent dermatologist secondary to issues venous stasis dermatitis . Will be going down to [place] after xmas    Past Medical History:   Diagnosis Date   • A-fib (CMD)    • Actinic keratosis    • Anatomical narrow angle, bilateral    • Aortic stenosis 2020    severe   • Arthritis    • Asthma     nothing current   • Basal cell carcinoma 01/18/2021    right lateral zygoma   • Cataract    • Chronic back pain    • Diabetes mellitus (CMD)     \"pre diabetic\"   • Fibromyalgia    • Fracture     compression frx spine   • GERD (gastroesophageal reflux disease)    • High cholesterol    • Hypermetropia of both eyes    • Hyperparathyroidism (CMD)    • Hypertension    • Liver disease     Hepatitis as teen   • Lumbar spondylosis    • Migraine    • Mitral valve stenosis, moderate    • Pneumonia 2018   • Regular astigmatism of both eyes with presbyopia    • Renal stones    • Sacroiliac dysfunction 12/07/2020    bilateral   • Sixth (abducent) nerve palsy, right eye    • Sleep apnea     wears CPAP   • Sleep disturbances 05/24/2017   • Squamous cell skin cancer    • Thyroid condition     hypothyroid   • Use of cane as ambulatory aid    • Wears glasses          Past Surgical History:   Procedure Laterality Date   • Aorta bifemoral angiogram/possible pta/possible stent - cv  07/28/2020   • Appendectomy     • Cardiac catherization     • Cardiac catheterization/possible ptca/possible stent  07/28/2020   • Cardioversion  08/01/2006, 2007   • Cataract extraction w/ intraocular lens  implant, bilateral     • Cholecystectomy     • Colonoscopy  08/01/2018     Prior authorization initiated for Jardiance,await 1-5 days for decision Repeat in 5 years, Benign Adenomatous Colon Polyps   • Colonoscopy diagnostic  10/04/2012   • Dexa bone density axial skeleton  03/17/2006   • Eye surgery     • Fasciotomy Right 05/16/2022    ope right lateral, excisional debridement   • Joint replacement     • Knee scope,diagnostic  09/08/2008    Knee Arthroscopy, left and right   • Knee scope,diagnostic Right 01/01/2012    Knee Arthroscopy   • Laminectomy      cervical   • Mammo screening bilateral  04/17/2013   • Medial branch block      lumbar   • Parathyroidectomy     • Radiofrequency ablation  07/24/2019    lumbar nerves   • Salpingoophorectomy      unilateral   • Skin biopsy  07/03/2019   • Skin surgery  01/26/2021    Mohs right medial temple    • Tavr iliofemoral-cv  08/03/2020   • Thyroid lobectomy      right   • Total abdominal hysterectomy     • Total knee replacement Right 06/14/2013   • Total knee replacement Left 01/01/2009       MEDICATIONS:  Current Outpatient Medications   Medication Sig Dispense Refill   • gabapentin (NEURONTIN) 300 MG capsule TAKE 1 CAPSULE BY MOUTH FOUR TIMES DAILY 120 capsule 1   • [START ON 11/17/2023] traMADol (ULTRAM) 50 MG tablet Take 1-2 tablets by mouth daily as needed for Pain. Do not start before November 17, 2023. 56 tablet 0   • [START ON 12/15/2023] traMADol (ULTRAM) 50 MG tablet Take 1-2 tablets by mouth daily as needed for Pain. Do not start before December 15, 2023. 56 tablet 0   • metoPROLOL succinate (TOPROL-XL) 25 MG 24 hr tablet TAKE 1 TABLET BY MOUTH EVERY DAY 90 tablet 0   • levothyroxine 125 MCG tablet TAKE 1 TABLET BY MOUTH DAILY 30 tablet 11   • simvastatin (ZOCOR) 20 MG tablet TAKE 1 TABLET BY MOUTH EVERY DAY 90 tablet 3   • aspirin 81 MG chewable tablet CHEW AND SWALLOW 1 TABLET BY MOUTH DAILY 90 tablet 3   • ferrous sulfate 324 (65 Fe) MG EC tablet TAKE 1 TABLET BY MOUTH DAILY WITH BREAKFAST 90 tablet 3   • clopidogrel (PLAVIX) 75 MG tablet TAKE 1 TABLET BY MOUTH DAILY 90 tablet 2   • empagliflozin  (Jardiance) 10 MG tablet Take 1 tablet by mouth daily (before breakfast). 30 tablet 11   • cephalexin (Keflex) 500 MG capsule Take 1 capsule by mouth in the morning and 1 capsule at noon and 1 capsule in the evening. 30 capsule 0   • predniSONE (DELTASONE) 10 MG tablet Take 1 tablet by mouth daily. 5 tablet 0   • pantoprazole (PROTONIX) 40 MG tablet Take 1 tablet by mouth daily. 30 tablet 2   • clotrimazole-betamethasone (LOTRISONE) 1-0.05 % cream Apply 1 application. topically 2 times daily as needed (itching). 30 g 0   • ketoconazole (NIZORAL) 2 % cream APPLY TOPICALLY TO THE AFFECTED AREA TWICE DAILY     • traMADol (ULTRAM) 50 MG tablet Take 1 tablet by mouth every 6 hours as needed.     • spironolactone-hydrochlorothiazide (ALDACTAZIDE) 25-25 MG per tablet TAKE 1 TABLET BY MOUTH DAILY 90 tablet 3   • Cholecalciferol 50 mcg (2,000 units) capsule      • Cyanocobalamin (B-12) 1000 MCG Cap gummies     • Multiple Vitamins-Minerals (HAIR SKIN AND NAILS FORMULA PO) Take by mouth daily.     • Acetaminophen Extra Strength 500 MG tablet TAKE 1 TABLET BY MOUTH FOUR TIMES DAILY, AT 7AM, 11AM, 4PM, AND 8PM, ON AN EMPTY STOMACH (Patient taking differently: Take 1,000 mg by mouth every 4 hours as needed for Pain.) 120 tablet 3   • DISPENSE [facility]:   ICD 10: S62.002K  Fracture gap <1cm 1 Device 0   • triamcinolone (ARISTOCORT) 0.1 % ointment Apply topically 2 times daily as needed (up to 1 week AFTER 5-fluorouracil application). 30 g 3   • Accu-Chek FastClix Lancets Misc TEST BLOOD SUGAR TWICE DAILY AS DIRECTED BY DOCTOR 102 each 3   • Ascorbic Acid (VITAMIN C) 500 MG tablet Take 1,000 mg by mouth daily.      • blood glucose meter Test blood sugar 2 times daily as directed. 1 kit 0   • blood glucose (ACCU-CHEK COMPACT PLUS) test strip 2 times daily. 200 strip 2   • CALCIUM-VITAMIN D PO Take 1 tablet by mouth daily. 600 mg calcium-400 units D3     • DISPENSE Please dispense to patient an accu-check compact plus  glucometer. Patient test twice daily 1 each 0     No current facility-administered medications for this visit.       ALLERGIES:  Allergies as of 11/13/2023 - Reviewed 11/13/2023   Allergen Reaction Noted   • Kiwi PRURITUS and THROAT SWELLING    • Esomeprazole Other (See Comments) 02/14/2021   • Nexium DIARRHEA        REVIEW OF SYSTEMS:  Constitutional:  Denies fever or chills, weight gain or loss.    Respiratory:  Denies shortness of breath, cough or wheezing.   Cardiovascular:  Denies chest pain or palpitations, no PND or orthopnea.  Gastrointestinal:  Denies abdominal pain, nausea, vomiting, bloody stools or diarrhea.  Neurologic:  Denies headache, focal weakness or sensory changes.     PHYSICAL EXAMINATION:  Constitutional:  Well-developed, well-nourished, no acute distress, non-toxic appearance.  Respiratory: Lungs are clear. No wheezing, rhonchi or rales.  Cardiovascular:  Normal rate, irregular rhythm, 1/6 murmur, no gallops, no rubs.   Skin: under left pannus there is an erythematous rash with no open skin.     ASSESSMENT:   1. Hypertension complicating diabetes (CMD)    2. Hyperlipidemia associated with type 2 diabetes mellitus (CMD)    3. Type 2 diabetes mellitus with stage 3a chronic kidney disease, without long-term current use of insulin (CMD)    4. Iatrogenic hypothyroidism    5. Chronic diastolic CHF (congestive heart failure) (CMD)    6. Anemia, unspecified type    7. Paroxysmal atrial fibrillation (CMD)    8. Secondary hypercoagulable state (CMD)    9. Pulmonary emphysema, unspecified emphysema type (CMD)    10. PVD (peripheral vascular disease) (CMD)        PLAN:  No orders of the defined types were placed in this encounter.    Return in about 1 year (around 11/13/2024) for Medicare Wellness Visit.        SUMMARY OF ISSUES:  1. Hypertension: Excellent control on metoprolol extended release 25 mg once a day. No dizziness, lightheadedness.   2. CKD stage 2:  Most recent GFR of 86 on  7/14/2023 .  Patient is on diuretics: on spironolactone/HCTZ and furosemide  3. Chronic diastolic congestive heart failure :  LVEF of 60-65% by transthoracic echo 3/2/2022 at [facility].  Patient does have elements of LVH.  Patient is on spironolactone 25/25 mg daily, and furosemide 40 twice a day.   Patient has lost 22 lbs since last visit 7/26/23   4. History of anemia:  Hemoglobin of most recent hemoglobin of 10.9 0.7 14 2023 at [facility], more less baseline for the patient  5. DM 2: Overall excellent control with a fasting blood sugar of 122 and glycohemoglobin of 5.9 on 4/24/2023, on diet managment  6. Hyperlipidemia: Well controlled on low dose simvastatin 20 mg, daily.  It does not appear that previous symptoms of myalgias were related to the statin agent. LDL of 82 and HDL of 62 on 4/24/2023.  7.  Hypothyroidism: Currently on replacement. TSH of 1.257 on 4/24/2023  8. CLARIBEL on CPAP:  Patient followed by Dr. Pacheco  9.  Moderate aortic valve stenosis: TAVR per Dr. Chauhan and Dr. Malik 8/3/2020. Right profunda artery laceration s/p successful surgical repair performed.  10. Asthma/emphysema: Lungs are quite clear, really no evidence of bronchospasm at this time. Patient not really on any controller agents.  Patient being followed by Dr Pacheco  11. Chronic atrial fibrillation: VR well controlled on metoprolol. Had a watchmen procedure 5/12/23 and is no longer on Xaralto .   12.  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  Recent vascular evaluation shows adequate supply.  Excellent right pedal pulse.  Only area of concern remains the 2nd digit which still appears to have elements of some vascular compromise. Discussed options with patient including possible toe amputation which she is reluctant to consider at this time.   14. Diffuse myalgias: not resolved following discontinuation of statin agent. Sent to Rheumatology, no evidence of inflammatory/autoimmune issues. Placed on  gabapentin 300 mg TID which has been helpful. Most likely secondary to elements of fibromyalgia.  15. History of primary hyperparathyroidism. Status post parathyroidectomy and right thyroid lobectomy at [facility] 02/2006  16. Chronic neck and back pain: Patient has a history of having chronic neck and back pain due to degenerative joint disease with spinal stenosis and has had neck surgery. Patient had been followed by Dr. Dobbs from pain clinic, currently being followed by Dr. Galindo. Currently on tramadol 2 tablets a day, stopped Flexeril. Referral placed to [place] pain management for the takeover of Tramadol  17. Essential Tremor: had been seen by Dr Garcia, patient states he didn't tell her anything I did not and does not want to go back     18. Peripheral neuropathy:  Gabapentin has been helpful to some extent  19. Peptic ulcer disease: not on any agents. Discussed OTC omeprazole if she does develop symptoms in the future   20. Bilateral ankle edema/venous stasis dermatitis: Weekly applications of Unna boots.  Patient on multiple diuretics       RECENT HOSPITALIZATIONS:  1. Admission to [facility] from 5/6/2022 through 5/19/2022 secondary to right lower extremity arterial emboli with critical limb ischemia.  Patient was treated with thromboembolectomy and thrombolytics therapy.  Patient had been on warfarin prior to the event but the been subtherapeutic.  Patient was discharged on Xarelto.  She did require fasciectomy to the medial and lateral aspect of the right lower extremity and has been undergoing wound care. Patient was then admitted to [facility] on 5/19/22 and discharged on 6/16/2022  Patient was then discharged to assisted living and undergoing physical therapy and occupational therapy.  Patient is using a 4 wheeled walker and continued to receive wound care        2. Patient was admitted from 03/03/2022 to 03/08/2022 to [facility] for acute respiratory  failure, hypoxia and acute chronic heart failure. Her echocardiogram from her visit showed an EF of 65%. Patient discharged from [facility] on 3/28/22 and now is in assisted living.        3. Admission at [facility] 12/19/2021 through 12/29/2021 secondary to acute respiratory failure with hypoxia.  She was thought to possibly had Pneumocystis pneumonia and was treated with steroids and oral Bactrim which she has completed.  Respiratory symptoms improved and she was able to be taper off oxygen.  Patient was followed up by Infectious Disease and placed on oral Bactrim 3 days a week.  Patient does have a history of having previous C difficile and was given additional taper of oral vancomycin. Patient was transferred to [facility] on 12/29/2021 and discharge on 1/24/2022 with discharge on.  Patient did have significant weakness and has gone through physical therapy with significant improvement.  Patient was noted to have some skin breakdown L in the sacral region which had improved with local measures.  Patient does have a history of having diastolic heart failure and dose of furosemide was increased from 20 mg daily to 20 twice a day        Summary of plan:  1.  Follow-up upon return from [place] (April - May)  2. Lab work fasting near future  3. Continue medications as they are

## 2023-11-26 RX ORDER — FENOFIBRATE 67 MG/1
67 CAPSULE ORAL NIGHTLY
Qty: 90 CAPSULE | Refills: 3 | Status: SHIPPED | OUTPATIENT
Start: 2023-11-26

## 2023-11-26 NOTE — TELEPHONE ENCOUNTER
Patient reported medication. Please advise on refill request.    Requested Prescriptions     Pending Prescriptions Disp Refills    FENOFIBRATE MICRONIZED 67 MG Oral Cap [Pharmacy Med Name: FENOFIBRATE 67 MG CAPSULE] 90 capsule 2     Sig: TAKE 1 CAPSULE BY MOUTH EVERY NIGHT      Recent Visits  Date Type Provider Dept   11/01/23 Office Visit Jaja Duran MD [facility]   05/03/23 Office Visit Jaja Duran MD [facility]   02/02/23 Office Visit Jaja Duran MD [facility]   01/25/23 [facility]   Showing recent visits within past 540 days with a meds authorizing provider and meeting all other requirements  Future Appointments  No visits were found meeting these conditions.   Showing future appointments within next 150 days with a meds authorizing provider and meeting all other requirements     Requested Prescriptions   Pending Prescriptions Disp Refills    FENOFIBRATE MICRONIZED 67 MG Oral Cap [Pharmacy Med Name: FENOFIBRATE 67 MG CAPSULE] 90 capsule 2     Sig: TAKE 1 CAPSULE BY MOUTH EVERY NIGHT       Cholesterol Medication Protocol Failed - 11/26/2023  7:05 AM        Failed - Last ALT < 80     Lab Results   Component Value Date     (H) 01/25/2023             Passed - ALT in past 12 months        Passed - LDL in past 12 months        Passed - Last LDL < 130     Lab Results   Component Value Date    LDL 81 01/25/2023             Passed - In person appointment or virtual visit in the past 12 mos or appointment in next 3 mos     Recent Outpatient Visits              3 weeks ago Type 2 diabetes mellitus without complication, without long-term current use of insulin ([facility]    [address], [address], Alexandre Lim MD    Office Visit    2 months ago COVID-19 virus infection    [facility], Main [address], [place], [place]    Telemedicine    6 months ago Type 2 diabetes mellitus without complication, without long-term current use of insulin [facility])    [place] Kurtz, Rhys Mena MD    Office Visit    9 months ago Type 2 diabetes mellitus without complication, without long-term current use of insulin [facility])    [place]z, Brien Garcia MD    Office Visit    10 months ago Physical exam    [place] Kurtz, Brien Garcia MD    Office Visit          Future Appointments         Provider Department Appt Notes    In 5 months MD Michael Rios [address], [place] 6mo fu                   Future Appointments         Provider Department Appt Notes    In 5 months MD Michael Rios [address], [place] 6mo fu           Recent Outpatient Visits              3 weeks ago Type 2 diabetes mellitus without complication, without long-term current use of insulin [facility])    [place]io Kurtz, Brien Garcia MD    Office Visit    2 months ago COVID-19 virus infection    [facility], Main [address], [place], [place]    Telemedicine    6 months ago Type 2 diabetes mellitus without complication, without long-term current use of insulin [facility])    Antonio Kurtz Guadelupe Leash, MD    Office Visit    9 months ago Type 2 diabetes mellitus without complication, without long-term current use of insulin [facility])    Antonio Kurtz Guadelupe Leash, MD    Office Visit    10 months ago Physical exam    Brien Perez MD    Office Visit

## 2023-12-10 DIAGNOSIS — R09.81 NASAL CONGESTION: ICD-10-CM

## 2023-12-11 RX ORDER — FLUTICASONE PROPIONATE 50 MCG
2 SPRAY, SUSPENSION (ML) NASAL DAILY
Qty: 48 ML | Refills: 3 | Status: SHIPPED | OUTPATIENT
Start: 2023-12-11

## 2023-12-12 NOTE — TELEPHONE ENCOUNTER
Refill passed per [place] [facility] protocol. Requested Prescriptions   Pending Prescriptions Disp Refills    FLUTICASONE PROPIONATE 50 MCG/ACT Nasal Suspension [Pharmacy Med Name: FLUTICASONE PROP 50 MCG SPRAY] 48 mL 0     Si SPRAYS BY EACH NOSTRIL DAILY FOR 30 DOSES.        Allergy Medication Protocol Passed - 12/10/2023  7:06 AM        Passed - In person appointment or virtual visit in the past 12 mos or appointment in next 3 mos     Recent Outpatient Visits              1 month ago Type 2 diabetes mellitus without complication, without long-term current use of insulin ([facility]    [address], [place], Lorenzo Juarez MD    Office Visit    2 months ago COVID-19 virus infection    [facility], [address], [place], [employer] Telemedicine    7 months ago Type 2 diabetes mellitus without complication, without long-term current use of insulin [facility])    [address], [place], Lorenzo Juarez MD    Office Visit    10 months ago Type 2 diabetes mellitus without complication, without long-term current use of insulin [facility])    [address], [place], Lorenzo Juarez MD    Office Visit    10 months ago Physical exam    [address], Lorenzo Santana MD    Office Visit          Future Appointments         Provider Department Appt Notes    In 4 months MD Kamaljit Packer [place] 6mo fu

## 2024-01-19 DIAGNOSIS — E11.9 TYPE 2 DIABETES MELLITUS WITHOUT COMPLICATION, WITHOUT LONG-TERM CURRENT USE OF INSULIN (HCC): ICD-10-CM

## 2024-01-26 ENCOUNTER — PATIENT MESSAGE (OUTPATIENT)
Dept: FAMILY MEDICINE CLINIC | Facility: CLINIC | Age: 39
End: 2024-01-26

## 2024-01-26 DIAGNOSIS — E11.9 TYPE 2 DIABETES MELLITUS WITHOUT COMPLICATION, WITHOUT LONG-TERM CURRENT USE OF INSULIN (HCC): ICD-10-CM

## 2024-01-26 NOTE — TELEPHONE ENCOUNTER
From: Solomon Everett  To: MAYO TORRE  Sent: 1/26/2024 5:40 AM CST  Subject: Medication referral to insurance     Good morning, my insurance recently changed and has been updated in the system as of yesterday. Is it possible to send my prescriptions to my new insurance for approval? I went to  my ozempic and the insurance denied it. I was being charged $946.    Thank you in advance for your assistance!

## 2024-01-31 NOTE — TELEPHONE ENCOUNTER
Refill Passed Per Protocol    Requested Prescriptions   Pending Prescriptions Disp Refills    semaglutide 4 MG/3ML Subcutaneous Solution Pen-injector 1 each 3     Sig: Inject 1 mg into the skin once a week.       Diabetes Medication Protocol Passed - 1/30/2024  7:50 PM        Passed - Last A1C < 7.5 and within past 6 months     Lab Results   Component Value Date    A1C 6.3 (A) 11/01/2023             Passed - In person appointment or virtual visit in the past 6 mos or appointment in next 3 mos     Recent Outpatient Visits              3 months ago Type 2 diabetes mellitus without complication, without long-term current use of insulin ([facility])    [facility]    Office Visit    4 months ago COVID-19 virus infection    [facility], [place], Minhxuyen, PA-C    Telemedicine    9 months ago Type 2 diabetes mellitus without complication, without long-term current use of insulin ([facility])    [facility]    Office Visit    12 months ago Type 2 diabetes mellitus without complication, without long-term current use of insulin ([facility])    [facility]    Office Visit    1 year ago Physical exam    [facility], Julio Carreno MD    Office Visit          Future Appointments         Provider Department Appt Notes    [facility] 3 months [facility]               Passed - EGFRCR or GFRNAA > 50     GFR Evaluation  EGFRCR: 104 , resulted on 11/1/2023          Passed - GFR in the past 12 months             Future Appointments         Provider Department Appt Notes    In 3 months Julio Davis MD [facility] 6mo fu          Recent Outpatient Visits              3 months ago Type 2  diabetes mellitus without complication, without long-term current use of insulin (HCC)    [facility], [address], Julio Carreno MD    Office Visit    4 months ago COVID-19 virus infection    [facility], [place], Minhxuyen, PA-C    Telemedicine    9 months ago Type 2 diabetes mellitus without complication, without long-term current use of insulin ([facility])    [facility], Julio Carreno MD    Office Visit    12 months ago Type 2 diabetes mellitus without complication, without long-term current use of insulin ([facility]    Office Visit    1 year ago Physical exam    [facility], Julio Carreno MD    Office Visit

## 2024-01-31 NOTE — TELEPHONE ENCOUNTER
Pt contacted. Pt needs a new prescription for Ozempic sent. He has contacted his pharmacy and updated his insurance information. The previous prescription was processed under his old insurance and denied.

## 2024-02-05 ENCOUNTER — TELEPHONE (OUTPATIENT)
Dept: FAMILY MEDICINE CLINIC | Facility: CLINIC | Age: 39
End: 2024-02-05

## 2024-06-07 ENCOUNTER — LAB ENCOUNTER (OUTPATIENT)
Dept: LAB | Age: 39
End: 2024-06-07
Attending: FAMILY MEDICINE
Payer: COMMERCIAL

## 2024-06-07 ENCOUNTER — OFFICE VISIT (OUTPATIENT)
Dept: FAMILY MEDICINE CLINIC | Facility: CLINIC | Age: 39
End: 2024-06-07
Payer: COMMERCIAL

## 2024-06-07 VITALS
WEIGHT: 252 LBS | SYSTOLIC BLOOD PRESSURE: 132 MMHG | HEART RATE: 91 BPM | DIASTOLIC BLOOD PRESSURE: 86 MMHG | HEIGHT: 70 IN | BODY MASS INDEX: 36.08 KG/M2

## 2024-06-07 DIAGNOSIS — E11.9 TYPE 2 DIABETES MELLITUS WITHOUT COMPLICATION, WITHOUT LONG-TERM CURRENT USE OF INSULIN (HCC): ICD-10-CM

## 2024-06-07 DIAGNOSIS — E11.9 TYPE 2 DIABETES MELLITUS WITHOUT COMPLICATION, WITHOUT LONG-TERM CURRENT USE OF INSULIN (HCC): Primary | ICD-10-CM

## 2024-06-07 LAB
ALBUMIN SERPL-MCNC: 5 G/DL (ref 3.2–4.8)
ALBUMIN/GLOB SERPL: 1.6 {RATIO} (ref 1–2)
ALP LIVER SERPL-CCNC: 57 U/L
ALT SERPL-CCNC: 25 U/L
ANION GAP SERPL CALC-SCNC: 7 MMOL/L (ref 0–18)
AST SERPL-CCNC: 17 U/L (ref ?–34)
BILIRUB SERPL-MCNC: 0.9 MG/DL (ref 0.3–1.2)
BUN BLD-MCNC: 14 MG/DL (ref 9–23)
BUN/CREAT SERPL: 13.3 (ref 10–20)
CALCIUM BLD-MCNC: 10.1 MG/DL (ref 8.7–10.4)
CHLORIDE SERPL-SCNC: 104 MMOL/L (ref 98–112)
CHOLEST SERPL-MCNC: 172 MG/DL (ref ?–200)
CO2 SERPL-SCNC: 28 MMOL/L (ref 21–32)
CREAT BLD-MCNC: 1.05 MG/DL
CREAT UR-SCNC: 152.5 MG/DL
EGFRCR SERPLBLD CKD-EPI 2021: 93 ML/MIN/1.73M2 (ref 60–?)
FASTING PATIENT LIPID ANSWER: YES
FASTING STATUS PATIENT QL REPORTED: YES
GLOBULIN PLAS-MCNC: 3.2 G/DL (ref 2–3.5)
GLUCOSE BLD-MCNC: 93 MG/DL (ref 70–99)
HDLC SERPL-MCNC: 35 MG/DL (ref 40–59)
HEMOGLOBIN A1C: 6 % (ref 4.3–5.6)
LDLC SERPL CALC-MCNC: 113 MG/DL (ref ?–100)
MICROALBUMIN UR-MCNC: 1.5 MG/DL
MICROALBUMIN/CREAT 24H UR-RTO: 9.8 UG/MG (ref ?–30)
NONHDLC SERPL-MCNC: 137 MG/DL (ref ?–130)
OSMOLALITY SERPL CALC.SUM OF ELEC: 288 MOSM/KG (ref 275–295)
POTASSIUM SERPL-SCNC: 4.4 MMOL/L (ref 3.5–5.1)
PROT SERPL-MCNC: 8.2 G/DL (ref 5.7–8.2)
SODIUM SERPL-SCNC: 139 MMOL/L (ref 136–145)
TRIGL SERPL-MCNC: 136 MG/DL (ref 30–149)
VLDLC SERPL CALC-MCNC: 23 MG/DL (ref 0–30)

## 2024-06-07 PROCEDURE — 80053 COMPREHEN METABOLIC PANEL: CPT

## 2024-06-07 PROCEDURE — 82043 UR ALBUMIN QUANTITATIVE: CPT

## 2024-06-07 PROCEDURE — 36415 COLL VENOUS BLD VENIPUNCTURE: CPT

## 2024-06-07 PROCEDURE — 82570 ASSAY OF URINE CREATININE: CPT

## 2024-06-07 PROCEDURE — 80061 LIPID PANEL: CPT

## 2024-06-07 PROCEDURE — 83036 HEMOGLOBIN GLYCOSYLATED A1C: CPT | Performed by: FAMILY MEDICINE

## 2024-06-07 PROCEDURE — 99213 OFFICE O/P EST LOW 20 MIN: CPT | Performed by: FAMILY MEDICINE

## 2024-06-07 NOTE — PROGRESS NOTES
6/7/2024  11:51 AM    Solomon Everett is a 39 year old male.    Chief complaint(s):   Chief Complaint   Patient presents with    Diabetes     F/u     HPI:     Solomon Everett primary complaint is regarding Diabetes.     Patient Solomon Everett is a 39 year old male is here to be evaluated for type 2 diabetes.  Specifically, male has type 2, insulin none requiring diabetes. Compliance with treatment has been fair.  Patient's diabetes was first diagnosed Jan 2023.  Patient follows a 1800 calorie ADA diet.  Patient report experiencing the following diabetes related symptoms; Positive for polyuria, Positive for polydipsia, Negative for blurred vision.  Depression symptoms include none.  Tobacco screen: shanti  smoker.  Current meds include :  oral hypoglycemic include: Metformin 850 mg BID, Jardiance 25 mg , Ozempic 1 mg Q wk  Most recent lab results include glycohemoglobin 6.3%, microalbuminuria have been -.  In regard to preventative care, his last ophthalmology exam was in > 12 months ago.  Opthalmic evaluation have shown none pathology.  Concurrent relative health problems include hypertriglycerides.       HISTORY:  No past medical history on file.   No past surgical history on file.   Family History   Problem Relation Age of Onset    Heart Disorder Maternal Grandfather     Hypertension Maternal Grandfather       Social History:   Social History     Socioeconomic History    Marital status: Single   Tobacco Use    Smoking status: Never    Smokeless tobacco: Never        Immunizations:   Immunization History   Administered Date(s) Administered    Covid-19 Vaccine Moderna 100 mcg/0.5 ml 04/10/2021, 05/08/2021, 11/07/2021    Covid-19 Vaccine Moderna Bivalent 50mcg/0.5mL 12+ years 10/29/2022    FLUZONE 6 months and older PFS 0.5 ml (82422) 11/04/2020, 11/03/2021, 11/01/2023    TDAP 10/29/2022       Medications (Active prior to today's visit):  Current Outpatient Medications   Medication Sig Dispense Refill    semaglutide 4 MG/3ML  Subcutaneous Solution Pen-injector Inject 1 mg into the skin once a week. 1 each 3    fluticasone propionate 50 MCG/ACT Nasal Suspension 2 sprays by Nasal route daily. 48 mL 3    fenofibrate micronized 67 MG Oral Cap Take 1 capsule (67 mg total) by mouth nightly. 90 capsule 3    Empagliflozin (JARDIANCE) 25 MG Oral Tab Take 1 tablet by mouth daily. 90 tablet 3    metFORMIN 850 MG Oral Tab Take 1 tablet (850 mg total) by mouth 2 (two) times daily with meals. 180 tablet 3    Glucose Blood (ONETOUCH VERIO) In Vitro Strip 1 each by Finger stick route in the morning and 1 each before bedtime. Please test in the morning and 2 hour after each meal.. 100 strip 3       Allergies:  No Known Allergies      ROS:   Review of Systems   Constitutional:  Negative for appetite change, fatigue, fever and unexpected weight change.   Respiratory:  Negative for shortness of breath.    Cardiovascular:  Negative for chest pain.   Gastrointestinal:  Negative for abdominal pain.   Endocrine: Negative for polydipsia and polyuria.   Musculoskeletal:  Negative for myalgias.   Skin:  Negative for rash.   Neurological:  Negative for dizziness, weakness and headaches.       PHYSICAL EXAM:   VS: /86 (BP Location: Right arm, Patient Position: Sitting, Cuff Size: large)   Pulse 91   Ht 5' 10\" (1.778 m)   Wt 252 lb (114.3 kg)   BMI 36.16 kg/m²     Physical Exam  Vitals reviewed.   Constitutional:       Appearance: Normal appearance. He is well-developed.   HENT:      Head: Normocephalic.   Eyes:      General: No scleral icterus.     Conjunctiva/sclera: Conjunctivae normal.   Cardiovascular:      Rate and Rhythm: Normal rate and regular rhythm.      Heart sounds: Normal heart sounds.   Pulmonary:      Effort: Pulmonary effort is normal.      Breath sounds: Normal breath sounds.   Musculoskeletal:      Cervical back: Neck supple.   Feet:      Right foot:      Protective Sensation: 10 sites tested.  10 sites sensed.      Left foot:       Protective Sensation: 10 sites tested.  10 sites sensed.   Skin:     Findings: No rash.   Psychiatric:         Mood and Affect: Mood normal.         LABORATORY RESULTS:   No results found for: \"URCOLOR\", \"URCLA\", \"URINELEUK\", \"URINENITRITE\", \"URINEBLOOD\"   Results for orders placed or performed in visit on 06/07/24   POC Glycohemoglobin [36392]   Result Value Ref Range    HEMOGLOBIN A1C 6.0 (A) 4.3 - 5.6 %    Cartridge Lot# [id number] Numeric    Cartridge Expiration Date 2/12/26 Date       EKG / Spirometry : -     Radiology: No results found.     ASSESSMENT/PLAN:   Assessment   Encounter Diagnosis   Name Primary?    Type 2 diabetes mellitus without complication, without long-term current use of insulin (HCC) Yes       DIABETES A&P    LABORATORY & ORDERS: Blood test(s) ordered today ;   Orders Placed This Encounter   Procedures    POC Glycohemoglobin [59389]    Microalb/Creat Ratio, Random Urine    Lipid Panel    Comp Metabolic Panel (14)     Additional orders include:   MEDICATIONS:    semaglutide 4 MG/3ML Subcutaneous Solution Pen-injector, Inject 1 mg into the skin once a week., Disp: 1 each, Rfl: 3    fluticasone propionate 50 MCG/ACT Nasal Suspension, 2 sprays by Nasal route daily., Disp: 48 mL, Rfl: 3    fenofibrate micronized 67 MG Oral Cap, Take 1 capsule (67 mg total) by mouth nightly., Disp: 90 capsule, Rfl: 3    Empagliflozin (JARDIANCE) 25 MG Oral Tab, Take 1 tablet by mouth daily., Disp: 90 tablet, Rfl: 3    metFORMIN 850 MG Oral Tab, Take 1 tablet (850 mg total) by mouth 2 (two) times daily with meals., Disp: 180 tablet, Rfl: 3    Glucose Blood (ONETOUCH VERIO) In Vitro Strip, 1 each by Finger stick route in the morning and 1 each before bedtime. Please test in the morning and 2 hour after each meal.., Disp: 100 strip, Rfl: 3.  Requested Prescriptions      No prescriptions requested or ordered in this encounter      REFERRALS:       Procedures    POC Glycohemoglobin [94157]    Microalb/Creat Ratio,  Random Urine    Lipid Panel    Comp Metabolic Panel (14)     RECOMMENDATIONS: instructed in use of glucometer ( check fasting glucose rarely), return for training in administering insulin injections, adherence to an 1800 calorie ADA diet,  5 pound weight loss, a graduated exercise program, HgbA1C level checked quarterly, daily foot self-inspection, need for yearly flu shots, and avoid all sodas, juices, candy, chocolates, cakes, ice cream, etc.      FOLLOW-UP: Schedule a follow-up visit in 6 months.          Orders This Visit:  Orders Placed This Encounter   Procedures    POC Glycohemoglobin [[id number]]    Microalb/Creat Ratio, Random Urine    Lipid Panel    Comp Metabolic Panel (14)       Meds This Visit:  Requested Prescriptions      No prescriptions requested or ordered in this encounter       Imaging & Referrals:  None         MAYO TORRE MD

## 2024-06-24 ENCOUNTER — APPOINTMENT (OUTPATIENT)
Dept: URGENT CARE | Age: 39
End: 2024-06-24

## 2024-06-24 ENCOUNTER — WALK IN (OUTPATIENT)
Dept: URGENT CARE | Age: 39
End: 2024-06-24

## 2024-06-24 VITALS
DIASTOLIC BLOOD PRESSURE: 68 MMHG | RESPIRATION RATE: 20 BRPM | TEMPERATURE: 97.6 F | HEART RATE: 90 BPM | SYSTOLIC BLOOD PRESSURE: 130 MMHG | OXYGEN SATURATION: 99 %

## 2024-06-24 DIAGNOSIS — R09.89 RUNNY NOSE: ICD-10-CM

## 2024-06-24 DIAGNOSIS — J06.9 VIRAL UPPER RESPIRATORY INFECTION: Primary | ICD-10-CM

## 2024-06-24 LAB
INTERNAL PROCEDURAL CONTROLS ACCEPTABLE: YES
SARS-COV+SARS-COV-2 AG RESP QL IA.RAPID: NOT DETECTED
TEST LOT EXPIRATION DATE: 0
TEST LOT NUMBER: 0

## 2024-06-24 PROCEDURE — 99203 OFFICE O/P NEW LOW 30 MIN: CPT | Performed by: NURSE PRACTITIONER

## 2024-06-24 PROCEDURE — 87426 SARSCOV CORONAVIRUS AG IA: CPT | Performed by: NURSE PRACTITIONER

## 2024-06-24 RX ORDER — SEMAGLUTIDE 1.34 MG/ML
INJECTION, SOLUTION SUBCUTANEOUS
COMMUNITY
Start: 2024-06-03

## 2024-06-24 RX ORDER — EMPAGLIFLOZIN 25 MG/1
25 TABLET, FILM COATED ORAL DAILY
COMMUNITY
Start: 2024-06-08

## 2024-06-24 RX ORDER — FENOFIBRATE 67 MG/1
CAPSULE ORAL
COMMUNITY
Start: 2024-06-06

## 2024-06-24 RX ORDER — FLUTICASONE PROPIONATE 50 MCG
SPRAY, SUSPENSION (ML) NASAL
COMMUNITY
Start: 2024-03-12

## 2024-06-27 ENCOUNTER — TELEPHONE (OUTPATIENT)
Dept: FAMILY MEDICINE | Age: 39
End: 2024-06-27

## 2024-06-28 DIAGNOSIS — E11.9 TYPE 2 DIABETES MELLITUS WITHOUT COMPLICATION, WITHOUT LONG-TERM CURRENT USE OF INSULIN (HCC): ICD-10-CM

## 2024-07-02 RX ORDER — SEMAGLUTIDE 1.34 MG/ML
INJECTION, SOLUTION SUBCUTANEOUS
Qty: 9 ML | Refills: 6 | Status: SHIPPED | OUTPATIENT
Start: 2024-07-02

## 2024-07-02 NOTE — TELEPHONE ENCOUNTER
REFILL PASSED PER [facility] PROTOCOLS    Requested Prescriptions   Pending Prescriptions Disp Refills    OZEMPIC, 1 MG/DOSE, 4 MG/3ML Subcutaneous Solution Pen-injector [Pharmacy Med Name: OZEMPIC 4 MG/3 ML (1 MG/DOSE)]  6     Sig: INJECT 1MG INTO THE SKIN ONCE A WEEK       Diabetes Medication Protocol Passed - 6/28/2024  6:05 PM        Passed - Last A1C < 7.5 and within past 6 months     Lab Results   Component Value Date    A1C 6.0 (A) 06/07/2024             Passed - In person appointment or virtual visit in the past 6 mos or appointment in next 3 mos     Recent Outpatient Visits              3 weeks ago Type 2 diabetes mellitus without complication, without long-term current use of insulin ([facility])    [facility]    Office Visit    8 months ago Type 2 diabetes mellitus without complication, without long-term current use of insulin ([facility])    [facility] [address] [facility]    Office Visit    9 months ago COVID-19 virus infection    [facility], [place], Minhxuyen, PA-C    Telemedicine    1 year ago Type 2 diabetes mellitus without complication, without long-term current use of insulin ([facility])    [facility] [address] [facility]    Office Visit    1 year ago Type 2 diabetes mellitus without complication, without long-term current use of insulin ([facility])    [facility] [address] [facility]    Office Visit                      Passed - Microalbumin procedure in past 12 months or taking ACE/ARB        Passed - EGFRCR or GFRNAA > 50     GFR Evaluation  EGFRCR: 93 , resulted on 6/7/2024          Passed - GFR in the past 12 months               Recent Outpatient Visits              3 weeks ago Type 2 diabetes mellitus without complication, without long-term current use of insulin ([facility])    [facility]    Office Visit    8 months ago Type 2 diabetes mellitus without complication, without long-term current use of insulin ([facility]    [facility]    9 months ago COVID-19 virus infection    [facility] [place], Minhxuyen, PA-C    Telemedicine    1 year ago Type 2 diabetes mellitus without complication, without long-term current use of insulin ([facility])    [facility], Julio Carreno MD    Office Visit    1 year ago Type 2 diabetes mellitus without complication, without long-term current use of insulin ([facility]    [facility] Visit

## 2024-11-06 NOTE — TELEPHONE ENCOUNTER
REFILL PASSED PER [facility] PROTOCOLS    Requested Prescriptions   Pending Prescriptions Disp Refills    METFORMIN 850 MG Oral Tab [Pharmacy Med Name: METFORMIN  MG TABLET] 180 tablet 3     Sig: TAKE 1 TABLET BY MOUTH 2 TIMES DAILY WITH MEALS.       Diabetes Medication Protocol Passed - 11/6/2024 10:14 AM        Passed - Last A1C < 7.5 and within past 6 months     Lab Results   Component Value Date    A1C 6.0 (A) 06/07/2024             Passed - In person appointment or virtual visit in the past 6 mos or appointment in next 3 mos     Recent Outpatient Visits              5 months ago Type 2 diabetes mellitus without complication, without long-term current use of insulin ([facility])    [facility], Julio Carreno MD    Office Visit    1 year ago Type 2 diabetes mellitus without complication, without long-term current use of insulin ([facility])    [facility]    [facility] Visit    1 year ago COVID-19 virus infection    [facility], [place], Minhxuyen, PA-C    Telemedicine    1 year ago Type 2 diabetes mellitus without complication, without long-term current use of insulin ([facility]    [facility] Visit    1 year ago Type 2 diabetes mellitus without complication, without long-term current use of insulin ([facility])    [facility]    [facility]          Future Appointments         Provider Department Appt Notes    In 1 month [facility] Diabetes follow up. Maybe switch from ozempic to another medication                    Passed - Microalbumin procedure in past 12 months or taking ACE/ARB        Passed - EGFRCR or GFRNAA > 50     GFR Evaluation  EGFRCR: 93 , resulted on 6/7/2024           Passed - GFR in the past 12 months             Future Appointments         Provider Department Appt Notes    In 1 month Julio Davis MD [facility] Blood work. Diabetes follow up. Maybe switch from ozempic to another medication          Recent Outpatient Visits              5 months ago Type 2 diabetes mellitus without complication, without long-term current use of insulin ([facility])    [facility]    [facility]    1 year ago Type 2 diabetes mellitus without complication, without long-term current use of insulin ([facility])    [facility]    Office Visit    1 year ago COVID-19 virus infection    [facility], [place], Minhxuyen, PA-C    Telemedicine    1 year ago Type 2 diabetes mellitus without complication, without long-term current use of insulin ([facility])    [facility]    Office Visit    1 year ago Type 2 diabetes mellitus without complication, without long-term current use of insulin ([facility])    [facility]    [facility] Visit

## 2024-11-24 DIAGNOSIS — E11.9 TYPE 2 DIABETES MELLITUS WITHOUT COMPLICATION, WITHOUT LONG-TERM CURRENT USE OF INSULIN (HCC): ICD-10-CM

## 2024-11-24 RX ORDER — SEMAGLUTIDE 1.34 MG/ML
INJECTION, SOLUTION SUBCUTANEOUS
Qty: 9 ML | Refills: 6 | Status: CANCELLED | OUTPATIENT
Start: 2024-11-24

## 2024-11-27 NOTE — TELEPHONE ENCOUNTER
Refill passed per [facility] protocol.  Called [facility] pharmacy. Verified Patient has 5 refills on file for Ozempic    Requested Prescriptions   Pending Prescriptions Disp Refills    empagliflozin (JARDIANCE) 25 MG Oral Tab 90 tablet 3     Sig: Take 1 tablet (25 mg total) by mouth daily.       Diabetes Medication Protocol Passed - 11/27/2024 10:35 AM        Passed - Last A1C < 7.5 and within past 6 months     Lab Results   Component Value Date    A1C 6.0 (A) 06/07/2024             Passed - In person appointment or virtual visit in the past 6 mos or appointment in next 3 mos     Recent Outpatient Visits              5 months ago Type 2 diabetes mellitus without complication, without long-term current use of insulin ([facility]    [facility]    1 year ago Type 2 diabetes mellitus without complication, without long-term current use of insulin ([facility])    [facility]    Office Visit    1 year ago COVID-19 virus infection    [facility] [place], Min[place]en, PA-C    Telemedicine    1 year ago Type 2 diabetes mellitus without complication, without long-term current use of insulin ([facility])    [facility]    [facility] Visit    1 year ago Type 2 diabetes mellitus without complication, without long-term current use of insulin ([facility])    [facility]    [facility]          Future Appointments         Provider Department Appt Notes    In 3 weeks Julio Davis MD [facility] [place] Blood work. Diabetes follow up. Maybe switch from ozempic to another medication                    Passed - Microalbumin procedure in past 12 months or taking ACE/ARB        Passed - EGFRCR or GFRNAA > 50     GFR Evaluation  EGFRCR:  93 , resulted on 6/7/2024          Passed - GFR in the past 12 months             Recent Outpatient Visits              5 months ago Type 2 diabetes mellitus without complication, without long-term current use of insulin ([facility])    [facility] [address] [facility]    Office Visit    1 year ago Type 2 diabetes mellitus without complication, without long-term current use of insulin ([facility])    [facility] [address]    Office Visit    1 year ago COVID-19 virus infection    [facility], [place], Minhxuyen, PA-C    Telemedicine    1 year ago Type 2 diabetes mellitus without complication, without long-term current use of insulin ([facility])    [facility] [address] [facility]    Office Visit    1 year ago Type 2 diabetes mellitus without complication, without long-term current use of insulin ([facility])    [facility] [address] [facility]    Office Visit            Future Appointments         Provider Department Appt Notes    In 3 weeks [facility]. Diabetes follow up. Maybe switch from ozempic to another medication

## 2024-12-17 RX ORDER — FENOFIBRATE 67 MG/1
67 CAPSULE ORAL NIGHTLY
Qty: 90 CAPSULE | Refills: 3 | Status: SHIPPED | OUTPATIENT
Start: 2024-12-17

## 2024-12-17 NOTE — TELEPHONE ENCOUNTER
Refill passed per [facility] protocol.  Requested Prescriptions   Pending Prescriptions Disp Refills    FENOFIBRATE MICRONIZED 67 MG Oral Cap [Pharmacy Med Name: FENOFIBRATE 67 MG CAPSULE] 90 capsule 3     Sig: Take 1 capsule (67 mg total) by mouth nightly.       Cholesterol Medication Protocol Passed - 12/17/2024  3:32 PM        Passed - ALT < 80     Lab Results   Component Value Date    ALT 25 06/07/2024             Passed - ALT resulted within past year        Passed - Lipid panel within past 12 months     Lab Results   Component Value Date    CHOLEST 172 06/07/2024    TRIG 136 06/07/2024    HDL 35 (L) 06/07/2024     (H) 06/07/2024    VLDL 23 06/07/2024    NONHDLC 137 (H) 06/07/2024             Passed - In person appointment or virtual visit in the past 12 mos or appointment in next 3 mos     Recent Outpatient Visits              6 months ago Type 2 diabetes mellitus without complication, without long-term current use of insulin ([facility])    [facility]    [facility] Visit    1 year ago Type 2 diabetes mellitus without complication, without long-term current use of insulin ([facility])    [facility]    Office Visit    1 year ago COVID-19 virus infection    [facility] [place], Minhxuyen, PA-C    Telemedicine    1 year ago Type 2 diabetes mellitus without complication, without long-term current use of insulin ([facility])    [facility]    Office Visit    1 year ago Type 2 diabetes mellitus without complication, without long-term current use of insulin ([facility])    [facility]    [facility]          Future Appointments         Provider Department Appt Notes    Tomorrow [facility]  Blood [facility]. Diabetes follow up. Maybe switch from ozempic to another medication                       Recent Outpatient Visits              6 months ago Type 2 diabetes mellitus without complication, without long-term current use of insulin ([facility]    [facility] Visit    1 year ago Type 2 diabetes mellitus without complication, without long-term current use of insulin ([facility])    [facility]    Office Visit    1 year ago COVID-19 virus infection    [facility] [place], Min[place]en, PA-C    Telemedicine    1 year ago Type 2 diabetes mellitus without complication, without long-term current use of insulin ([facility])    [facility]    Office Visit    1 year ago Type 2 diabetes mellitus without complication, without long-term current use of insulin ([facility])    [facility]    [facility] Visit          Future Appointments         Provider Department Appt Notes    Tomorrow [facility]. Diabetes follow up. Maybe switch from ozempic to another medication

## 2024-12-18 ENCOUNTER — OFFICE VISIT (OUTPATIENT)
Dept: FAMILY MEDICINE CLINIC | Facility: CLINIC | Age: 39
End: 2024-12-18
Payer: COMMERCIAL

## 2024-12-18 ENCOUNTER — NURSE ONLY (OUTPATIENT)
Dept: INTERNAL MEDICINE CLINIC | Facility: CLINIC | Age: 39
End: 2024-12-18

## 2024-12-18 VITALS
HEIGHT: 70 IN | WEIGHT: 265.38 LBS | BODY MASS INDEX: 37.99 KG/M2 | SYSTOLIC BLOOD PRESSURE: 128 MMHG | HEART RATE: 85 BPM | DIASTOLIC BLOOD PRESSURE: 88 MMHG

## 2024-12-18 DIAGNOSIS — E66.812 CLASS 2 SEVERE OBESITY DUE TO EXCESS CALORIES WITH SERIOUS COMORBIDITY AND BODY MASS INDEX (BMI) OF 38.0 TO 38.9 IN ADULT (HCC): ICD-10-CM

## 2024-12-18 DIAGNOSIS — E11.9 TYPE 2 DIABETES MELLITUS WITHOUT COMPLICATION, WITHOUT LONG-TERM CURRENT USE OF INSULIN (HCC): ICD-10-CM

## 2024-12-18 DIAGNOSIS — E66.01 CLASS 2 SEVERE OBESITY DUE TO EXCESS CALORIES WITH SERIOUS COMORBIDITY AND BODY MASS INDEX (BMI) OF 38.0 TO 38.9 IN ADULT (HCC): ICD-10-CM

## 2024-12-18 DIAGNOSIS — E11.9 TYPE 2 DIABETES MELLITUS WITHOUT COMPLICATION, WITHOUT LONG-TERM CURRENT USE OF INSULIN (HCC): Primary | ICD-10-CM

## 2024-12-18 LAB
CARTRIDGE EXPIRATION DATE: ABNORMAL DATE
HEMOGLOBIN A1C: 6.5 % (ref 4.3–5.6)

## 2024-12-18 PROCEDURE — 92229 IMG RTA DETC/MNTR DS POC ALY: CPT | Performed by: FAMILY MEDICINE

## 2024-12-18 PROCEDURE — 99213 OFFICE O/P EST LOW 20 MIN: CPT | Performed by: FAMILY MEDICINE

## 2024-12-18 PROCEDURE — 83036 HEMOGLOBIN GLYCOSYLATED A1C: CPT | Performed by: FAMILY MEDICINE

## 2024-12-18 RX ORDER — SEMAGLUTIDE 2.68 MG/ML
2 INJECTION, SOLUTION SUBCUTANEOUS WEEKLY
Qty: 1 EACH | Refills: 12 | Status: SHIPPED | OUTPATIENT
Start: 2024-12-18

## 2024-12-18 NOTE — PROGRESS NOTES
12/18/2024  9:20 AM    Solomon Everett is a 39 year old male.    Chief complaint(s):   Chief Complaint   Patient presents with    Diabetes     F/u, requesting new Rx for glucometer     HPI:     Solomon Everett primary complaint is regarding DM.     Patient Solomon Everett is a 39 year old male is here to be evaluated for type 2 diabetes.  Specifically, male has type 2, insulin none requiring diabetes. Compliance with treatment has been fair.  Patient's diabetes was first diagnosed Jan 2023.  Patient follows a 1800 calorie ADA diet.  Patient report experiencing the following diabetes related symptoms; Positive for polyuria, Positive for polydipsia, Negative for blurred vision.  Depression symptoms include none.  Tobacco screen: shanti  smoker.  Current meds include :  oral hypoglycemic include: Metformin 850 mg BID, Jardiance 25 mg , Ozempic 1 mg Q wk  Most recent lab results include glycohemoglobin 6.5%, microalbuminuria have been -.  In regard to preventative care, his last ophthalmology exam was in > 12 months ago.  Opthalmic evaluation have shown none pathology.  Concurrent relative health problems include hypertriglycerides.         HISTORY:  No past medical history on file.   No past surgical history on file.   Family History   Problem Relation Age of Onset    Heart Disorder Maternal Grandfather     Hypertension Maternal Grandfather       Social History:   Social History     Socioeconomic History    Marital status: Single   Tobacco Use    Smoking status: Never    Smokeless tobacco: Never        Immunizations:   Immunization History   Administered Date(s) Administered    Covid-19 Vaccine Moderna 100 mcg/0.5 ml 04/10/2021, 05/08/2021, 11/07/2021    Covid-19 Vaccine Moderna Bivalent 50mcg/0.5mL 12+ years 10/29/2022    FLUZONE 6 months and older PFS 0.5 ml (32674) 11/04/2020, 11/03/2021, 11/01/2023    Flucelvax Influenza vaccine, trivalent (ccIIV3), 0.5mL IM 10/18/2024    Influenza 10/29/2022    Pfizer Covid-19 Vaccine  30mcg/0.3ml 12yrs+ 10/18/2024    TDAP 10/29/2022       Medications (Active prior to today's visit):  Current Outpatient Medications   Medication Sig Dispense Refill    semaglutide (OZEMPIC, 2 MG/DOSE,) 8 MG/3ML Subcutaneous Solution Pen-injector Inject 2 mg into the skin once a week. 1 each 12    fenofibrate micronized 67 MG Oral Cap Take 1 capsule (67 mg total) by mouth nightly. 90 capsule 3    empagliflozin (JARDIANCE) 25 MG Oral Tab Take 1 tablet (25 mg total) by mouth daily. 90 tablet 3    metFORMIN 850 MG Oral Tab Take 1 tablet (850 mg total) by mouth 2 (two) times daily with meals. 180 tablet 3    semaglutide (OZEMPIC, 1 MG/DOSE,) 4 MG/3ML Subcutaneous Solution Pen-injector INJECT 1MG INTO THE SKIN ONCE A WEEK 9 mL 6    fluticasone propionate 50 MCG/ACT Nasal Suspension 2 sprays by Nasal route daily. (Patient not taking: Reported on 12/18/2024) 48 mL 3    Glucose Blood (ONETOUCH VERIO) In Vitro Strip 1 each by Finger stick route in the morning and 1 each before bedtime. Please test in the morning and 2 hour after each meal.. 100 strip 3       Allergies:  Allergies[1]      ROS:   Review of Systems    PHYSICAL EXAM:   VS: /88   Pulse 85   Ht 5' 10\" (1.778 m)   Wt 265 lb 6.4 oz (120.4 kg)   BMI 38.08 kg/m²     Physical Exam  Vitals reviewed.   Constitutional:       Appearance: Normal appearance. He is well-developed.   HENT:      Head: Normocephalic.   Eyes:      General: No scleral icterus.     Conjunctiva/sclera: Conjunctivae normal.   Cardiovascular:      Rate and Rhythm: Normal rate.   Pulmonary:      Effort: Pulmonary effort is normal.   Musculoskeletal:      Cervical back: Neck supple.   Skin:     Findings: No rash.   Psychiatric:         Mood and Affect: Mood normal.         LABORATORY RESULTS:   No results found for: \"URCOLOR\", \"URCLA\", \"URINELEUK\", \"URINENITRITE\", \"URINEBLOOD\"   Results for orders placed or performed in visit on 12/18/24   POC Glycohemoglobin [27095]    Collection Time:  12/18/24  9:23 AM   Result Value Ref Range    HEMOGLOBIN A1C 6.5 (A) 4.3 - 5.6 %    Cartridge Lot# [id number] Numeric    Cartridge Expiration Date [id number] Date       EKG / Spirometry : -     Radiology: No results found.     ASSESSMENT/PLAN:   Assessment   Encounter Diagnoses   Name Primary?    Type 2 diabetes mellitus without complication, without long-term current use of insulin ([facility]) Yes    Class 2 severe obesity due to excess calories with serious comorbidity and body mass index (BMI) of 38.0 to 38.9 in adult ([facility])        DIABETES A&P    LABORATORY & ORDERS: Blood test(s) ordered today ;   Orders Placed This Encounter   Procedures    POC Glycohemoglobin [20894]    Diabetic Retinopathy Exam  OU - Both Eyes     Additional orders include: increased Ozempic 2 mg Q week  MEDICATIONS:    semaglutide (OZEMPIC, 2 MG/DOSE,) 8 MG/3ML Subcutaneous Solution Pen-injector, Inject 2 mg into the skin once a week., Disp: 1 each, Rfl: 12    fenofibrate micronized 67 MG Oral Cap, Take 1 capsule (67 mg total) by mouth nightly., Disp: 90 capsule, Rfl: 3    empagliflozin (JARDIANCE) 25 MG Oral Tab, Take 1 tablet (25 mg total) by mouth daily., Disp: 90 tablet, Rfl: 3    metFORMIN 850 MG Oral Tab, Take 1 tablet (850 mg total) by mouth 2 (two) times daily with meals., Disp: 180 tablet, Rfl: 3    semaglutide (OZEMPIC, 1 MG/DOSE,) 4 MG/3ML Subcutaneous Solution Pen-injector, INJECT 1MG INTO THE SKIN ONCE A WEEK, Disp: 9 mL, Rfl: 6    fluticasone propionate 50 MCG/ACT Nasal Suspension, 2 sprays by Nasal route daily. (Patient not taking: Reported on 12/18/2024), Disp: 48 mL, Rfl: 3    Glucose Blood (ONETOUCH VERIO) In Vitro Strip, 1 each by Finger stick route in the morning and 1 each before bedtime. Please test in the morning and 2 hour after each meal.., Disp: 100 strip, Rfl: 3.  Requested Prescriptions     Signed Prescriptions Disp Refills    semaglutide (OZEMPIC, 2 MG/DOSE,) 8 MG/3ML Subcutaneous Solution Pen-injector 1 each 12      Sig: Inject 2 mg into the skin once a week.      REFERRALS:       Procedures    POC Glycohemoglobin [12163]     RECOMMENDATIONS: instructed in use of glucometer ( check fasting glucose daily), return for training in administering insulin injections, adherence to an 1800 calorie ADA diet,  10 pound weight loss, a graduated exercise program, HgbA1C level checked quarterly, daily foot self-inspection, need for yearly flu shots, and avoid all sodas, juices, candy, chocolates, cakes, ice cream, etc.      FOLLOW-UP: Schedule a follow-up visit in 6 months.              Orders This Visit:  Orders Placed This Encounter   Procedures    POC Glycohemoglobin [47672]    Diabetic Retinopathy Exam  [facility] - Both Eyes       Meds This Visit:  Requested Prescriptions     Signed Prescriptions Disp Refills    semaglutide (OZEMPIC, 2 MG/DOSE,) 8 MG/3ML Subcutaneous Solution Pen-injector 1 each 12     Sig: Inject 2 mg into the skin once a week.       Imaging & Referrals:  OPHTHALMOLOGY - EXTERNAL         MAYO TORRE MD         [1] No Known Allergies

## 2024-12-18 NOTE — PROGRESS NOTES
Patient is here for DM retina camera eye exam. Verified full name, , and active order for diabetic retinopathy exam [facility]. DM eye exam completed. PCP advised of eye exam results. Referral generated for Ophthalmology. Information related to pt and advised that his is to f/u with ophthalmology to confirm results. Patient verbalized understanding.